# Patient Record
Sex: FEMALE | Race: BLACK OR AFRICAN AMERICAN | NOT HISPANIC OR LATINO | Employment: FULL TIME | URBAN - METROPOLITAN AREA
[De-identification: names, ages, dates, MRNs, and addresses within clinical notes are randomized per-mention and may not be internally consistent; named-entity substitution may affect disease eponyms.]

---

## 2018-01-29 ENCOUNTER — OFFICE VISIT (OUTPATIENT)
Dept: URGENT CARE | Facility: CLINIC | Age: 63
End: 2018-01-29
Payer: COMMERCIAL

## 2018-01-29 VITALS
DIASTOLIC BLOOD PRESSURE: 78 MMHG | WEIGHT: 225 LBS | TEMPERATURE: 101 F | HEART RATE: 104 BPM | RESPIRATION RATE: 16 BRPM | HEIGHT: 66 IN | BODY MASS INDEX: 36.16 KG/M2 | OXYGEN SATURATION: 96 % | SYSTOLIC BLOOD PRESSURE: 127 MMHG

## 2018-01-29 DIAGNOSIS — R68.89 FLU-LIKE SYMPTOMS: Primary | ICD-10-CM

## 2018-01-29 PROCEDURE — 99203 OFFICE O/P NEW LOW 30 MIN: CPT | Mod: S$GLB,,, | Performed by: EMERGENCY MEDICINE

## 2018-01-29 RX ORDER — CODEINE PHOSPHATE AND GUAIFENESIN 10; 100 MG/5ML; MG/5ML
5-10 SOLUTION ORAL 3 TIMES DAILY PRN
Qty: 120 ML | Refills: 0 | Status: SHIPPED | OUTPATIENT
Start: 2018-01-29 | End: 2018-02-03

## 2018-01-29 RX ORDER — OSELTAMIVIR PHOSPHATE 75 MG/1
75 CAPSULE ORAL 2 TIMES DAILY
Qty: 10 CAPSULE | Refills: 0 | Status: SHIPPED | OUTPATIENT
Start: 2018-01-29 | End: 2018-02-03

## 2018-01-29 NOTE — PATIENT INSTRUCTIONS
Porfirio Griffin MD  Go to the Emergency Department for any problems  Call your PCP for follow up next available.    Influenza (Adult)    Influenza is also called the flu. It is a viral illness that affects the air passages of your lungs. It is different from the common cold. The flu can easily be passed from one to person to another. It may be spread through the air by coughing and sneezing. Or it can be spread by touching the sick person and then touching your own eyes, nose, or mouth.  The flu starts 1 to 3 days after you are exposed to the flu virus. It may last for 1 to 2 weeks but many people feel tired or fatigued for many weeks afterward. You usually dont need to take antibiotics unless you have a complication. This might be an ear or sinus infection or pneumonia.  Symptoms of the flu may be mild or severe. They can include extreme tiredness (wanting to stay in bed all day), chills, fevers, muscle aches, soreness with eye movement, headache, and a dry, hacking cough.  Home care  Follow these guidelines when caring for yourself at home:  · Avoid being around cigarette smoke, whether yours or other peoples.  · Acetaminophen or ibuprofen will help ease your fever, muscle aches, and headache. Dont give aspirin to anyone younger than 18 who has the flu. Aspirin can harm the liver.  · Nausea and loss of appetite are common with the flu. Eat light meals. Drink 6 to 8 glasses of liquids every day. Good choices are water, sport drinks, soft drinks without caffeine, juices, tea, and soup. Extra fluids will also help loosen secretions in your nose and lungs.  · Over-the-counter cold medicines will not make the flu go away faster. But the medicines may help with coughing, sore throat, and congestion in your nose and sinuses. Dont use a decongestant if you have high blood pressure.  · Stay home until your fever has been gone for at least 24 hours without using medicine to reduce fever.  Follow-up care  Follow up with  "your healthcare provider, or as advised, if you are not getting better over the next week.  If you are age 65 or older, talk with your provider about getting a pneumococcal vaccine every 5 years. You should also get this vaccine if you have chronic asthma or COPD. All adults should get a flu vaccine every fall. Ask your provider about this.  When to seek medical advice  Call your healthcare provider right away if any of these occur:  · Cough with lots of colored mucus (sputum) or blood in your mucus  · Chest pain, shortness of breath, wheezing, or trouble breathing  · Severe headache, or face, neck, or ear pain  · New rash with fever  · Fever of 100.4°F (38°C) or higher, or as directed by your healthcare provider  · Confusion, behavior change, or seizure  · Severe weakness or dizziness  · You get a new fever or cough after getting better for a few days  Date Last Reviewed: 1/1/2017  © 4416-7878 [employer]. [address] [place] [address]. All rights reserved. This information is not intended as a substitute for professional medical care. Always follow your healthcare professional's instructions.        Viral Syndrome (Adult)  A viral illness may cause a number of symptoms. The symptoms depend on the part of the body that the virus affects. If it settles in your nose, throat, and lungs, it may cause cough, sore throat, congestion, and sometimes headache. If it settles in your stomach and intestinal tract, it may cause vomiting and diarrhea. Sometimes it causes vague symptoms like "aching all over," feeling tired, loss of appetite, or fever.  A viral illness usually lasts 1 to 2 weeks, but sometimes it lasts longer. In some cases, a more serious infection can look like a viral syndrome in the first few days of the illness. You may need another exam and additional tests to know the difference. Watch for the warning signs listed below.  Home care  Follow these guidelines for taking care of " yourself at home:  · If symptoms are severe, rest at home for the first 2 to 3 days.  · Stay away from cigarette smoke - both your smoke and the smoke from others.  · You may use over-the-counter acetaminophen or ibuprofen for fever, muscle aching, and headache, unless another medicine was prescribed for this. If you have chronic liver or kidney disease or ever had a stomach ulcer or GI bleeding, talk with your doctor before using these medicines. No one who is younger than 18 and ill with a fever should take aspirin. It may cause severe disease or death.  · Your appetite may be poor, so a light diet is fine. Avoid dehydration by drinking 8 to 12 8-ounce glasses of fluids each day. This may include water; orange juice; lemonade; apple, grape, and cranberry juice; clear fruit drinks; electrolyte replacement and sports drinks; and decaffeinated teas and coffee. If you have been diagnosed with a kidney disease, ask your doctor how much and what types of fluids you should drink to prevent dehydration. If you have kidney disease, drinking too much fluid can cause it build up in the your body and be dangerous to your health.  · Over-the-counter remedies won't shorten the length of the illness but may be helpful for cough, sore throat; and nasal and sinus congestion. Don't use decongestants if you have high blood pressure.  Follow-up care  Follow up with your healthcare provider if you do not improve over the next week.  Call 911  Get emergency medical care if any of the following occur:  · Convulsion  · Feeling weak, dizzy, or like you are going to faint  · Chest pain, shortness of breath, wheezing, or difficulty breathing  When to seek medical advice  Call your healthcare provider right away if any of these occur:  · Cough with lots of colored sputum (mucus) or blood in your sputum  · Chest pain, shortness of breath, wheezing, or difficulty breathing  · Severe headache; face, neck, or ear pain  · Severe, constant pain in  the lower right side of your belly (abdominal)  · Continued vomiting (cant keep liquids down)  · Frequent diarrhea (more than 5 times a day); blood (red or black color) or mucus in diarrhea  · Feeling weak, dizzy, or like you are going to faint  · Extreme thirst  · Fever of 100.4°F (38°C) or higher, or as directed by your healthcare provider  Date Last Reviewed: 9/25/2015  © 4238-4133 [employer]. [address] [place] [address]. All rights reserved. This information is not intended as a substitute for professional medical care. Always follow your healthcare professional's instructions.

## 2018-01-29 NOTE — PROGRESS NOTES
"Subjective:       Patient ID: Rufus Carpenter is a 62 y.o. female.    Vitals:  height is 5' 6" (1.676 m) and weight is 102.1 kg (225 lb). Her oral temperature is 100.8 °F (38.2 °C) (abnormal). Her blood pressure is 127/78 and her pulse is 104. Her respiration is 16 and oxygen saturation is 96%.     Chief Complaint: Fever    Symptoms started almost 2 d ago.      Fever    This is a new problem. The current episode started in the past 7 days. The problem occurs intermittently. The problem has been unchanged. The maximum temperature noted was 100 to 100.9 F. The temperature was taken using an oral thermometer. Associated symptoms include congestion, coughing, diarrhea, muscle aches and a sore throat. Pertinent negatives include no abdominal pain, chest pain, ear pain, headaches, nausea or wheezing. She has tried NSAIDs for the symptoms. The treatment provided mild relief.     Review of Systems   Constitution: Positive for chills. Negative for fever and malaise/fatigue.   HENT: Positive for congestion and sore throat. Negative for ear pain and hoarse voice.    Eyes: Negative for discharge and redness.   Cardiovascular: Negative for chest pain, dyspnea on exertion and leg swelling.   Respiratory: Positive for cough. Negative for shortness of breath, sputum production and wheezing.    Musculoskeletal: Positive for muscle weakness. Negative for myalgias.   Gastrointestinal: Positive for diarrhea. Negative for abdominal pain and nausea.   Neurological: Negative for headaches.       Objective:      Physical Exam   Constitutional: She is oriented to person, place, and time. She appears well-developed and well-nourished.   HENT:   Head: Normocephalic and atraumatic.   Right Ear: Tympanic membrane, external ear and ear canal normal.   Left Ear: Tympanic membrane, external ear and ear canal normal.   Nose: Mucosal edema present. No rhinorrhea. Right sinus exhibits no maxillary sinus tenderness and no frontal sinus tenderness. " Left sinus exhibits no maxillary sinus tenderness and no frontal sinus tenderness.   Mouth/Throat: Uvula is midline, oropharynx is clear and moist and mucous membranes are normal.   Eyes: EOM are normal. Pupils are equal, round, and reactive to light.   Neck: Normal range of motion. Neck supple.   Cardiovascular: Normal rate, regular rhythm and normal heart sounds.    Pulmonary/Chest: Breath sounds normal.   Musculoskeletal: Normal range of motion.   Lymphadenopathy:     She has no cervical adenopathy.   Neurological: She is alert and oriented to person, place, and time.   Skin: Skin is warm and dry.   Psychiatric: She has a normal mood and affect. Her behavior is normal.       Assessment:       1. Flu-like symptoms        Plan:         Flu-like symptoms  -     guaifenesin-codeine 100-10 mg/5 ml (TUSSI-ORGANIDIN NR)  mg/5 mL syrup; Take 5-10 mLs by mouth 3 (three) times daily as needed for Cough.  Dispense: 120 mL; Refill: 0  -     oseltamivir (TAMIFLU) 75 MG capsule; Take 1 capsule (75 mg total) by mouth 2 (two) times daily.  Dispense: 10 capsule; Refill: 0      Porfirio Griffin MD  Go to the Emergency Department for any problems  Call your PCP for follow up next available.

## 2018-01-29 NOTE — LETTER
January 29, 2018      [facility]  [address], [address]  [place] [address]  Phone: [phone]  Fax: [phone]       Patient: Rufus Carpenter   YOB: 1955  Date of Visit: 01/29/2018    To Whom It May Concern:    Adela Carpenter  was at [facility] on 01/29/2018. She may return to work/school on 2/5/18, earlier if feels better and no fever for 24 hours with no restrictions. If you have any questions or concerns, or if I can be of further assistance, please do not hesitate to contact me.    Sincerely,            Porfirio Griffin MD

## 2018-02-01 ENCOUNTER — TELEPHONE (OUTPATIENT)
Dept: URGENT CARE | Facility: CLINIC | Age: 63
End: 2018-02-01

## 2022-03-12 ENCOUNTER — HOSPITAL ENCOUNTER (INPATIENT)
Facility: HOSPITAL | Age: 67
LOS: 3 days | Discharge: HOME | DRG: 547 | End: 2022-03-15
Attending: EMERGENCY MEDICINE | Admitting: HOSPITALIST
Payer: MEDICARE

## 2022-03-12 DIAGNOSIS — M31.6 GCA (GIANT CELL ARTERITIS): Primary | ICD-10-CM

## 2022-03-12 DIAGNOSIS — R07.9 CHEST PAIN: ICD-10-CM

## 2022-03-12 DIAGNOSIS — E11.9 TYPE 2 DIABETES MELLITUS WITHOUT COMPLICATION, WITHOUT LONG-TERM CURRENT USE OF INSULIN: ICD-10-CM

## 2022-03-12 LAB
ALBUMIN SERPL BCP-MCNC: 3.8 G/DL (ref 3.5–5.2)
ALP SERPL-CCNC: 112 U/L (ref 55–135)
ALT SERPL W/O P-5'-P-CCNC: 9 U/L (ref 10–44)
ANION GAP SERPL CALC-SCNC: 14 MMOL/L (ref 8–16)
AST SERPL-CCNC: 15 U/L (ref 10–40)
BASOPHILS # BLD AUTO: 0.07 K/UL (ref 0–0.2)
BASOPHILS NFR BLD: 0.9 % (ref 0–1.9)
BILIRUB SERPL-MCNC: 0.3 MG/DL (ref 0.1–1)
BUN SERPL-MCNC: 14 MG/DL (ref 8–23)
CALCIUM SERPL-MCNC: 9.4 MG/DL (ref 8.7–10.5)
CHLORIDE SERPL-SCNC: 107 MMOL/L (ref 95–110)
CO2 SERPL-SCNC: 21 MMOL/L (ref 23–29)
CREAT SERPL-MCNC: 0.8 MG/DL (ref 0.5–1.4)
CRP SERPL-MCNC: 12.9 MG/L (ref 0–8.2)
CTP QC/QA: YES
DIFFERENTIAL METHOD: NORMAL
EOSINOPHIL # BLD AUTO: 0.2 K/UL (ref 0–0.5)
EOSINOPHIL NFR BLD: 2.3 % (ref 0–8)
ERYTHROCYTE [DISTWIDTH] IN BLOOD BY AUTOMATED COUNT: 13.6 % (ref 11.5–14.5)
ERYTHROCYTE [SEDIMENTATION RATE] IN BLOOD BY WESTERGREN METHOD: 56 MM/HR (ref 0–36)
EST. GFR  (AFRICAN AMERICAN): >60 ML/MIN/1.73 M^2
EST. GFR  (NON AFRICAN AMERICAN): >60 ML/MIN/1.73 M^2
GLUCOSE SERPL-MCNC: 96 MG/DL (ref 70–110)
HCT VFR BLD AUTO: 43.1 % (ref 37–48.5)
HGB BLD-MCNC: 13.8 G/DL (ref 12–16)
IMM GRANULOCYTES # BLD AUTO: 0.03 K/UL (ref 0–0.04)
IMM GRANULOCYTES NFR BLD AUTO: 0.4 % (ref 0–0.5)
LYMPHOCYTES # BLD AUTO: 3.2 K/UL (ref 1–4.8)
LYMPHOCYTES NFR BLD: 41.4 % (ref 18–48)
MAGNESIUM SERPL-MCNC: 1.9 MG/DL (ref 1.6–2.6)
MCH RBC QN AUTO: 27.7 PG (ref 27–31)
MCHC RBC AUTO-ENTMCNC: 32 G/DL (ref 32–36)
MCV RBC AUTO: 86 FL (ref 82–98)
MONOCYTES # BLD AUTO: 0.5 K/UL (ref 0.3–1)
MONOCYTES NFR BLD: 6.2 % (ref 4–15)
NEUTROPHILS # BLD AUTO: 3.8 K/UL (ref 1.8–7.7)
NEUTROPHILS NFR BLD: 48.8 % (ref 38–73)
NRBC BLD-RTO: 0 /100 WBC
PLATELET # BLD AUTO: 252 K/UL (ref 150–450)
PMV BLD AUTO: 10.1 FL (ref 9.2–12.9)
POTASSIUM SERPL-SCNC: 4.6 MMOL/L (ref 3.5–5.1)
PROT SERPL-MCNC: 8.3 G/DL (ref 6–8.4)
RBC # BLD AUTO: 4.99 M/UL (ref 4–5.4)
SARS-COV-2 RDRP RESP QL NAA+PROBE: NEGATIVE
SODIUM SERPL-SCNC: 142 MMOL/L (ref 136–145)
WBC # BLD AUTO: 7.8 K/UL (ref 3.9–12.7)

## 2022-03-12 PROCEDURE — 80053 COMPREHEN METABOLIC PANEL: CPT | Performed by: EMERGENCY MEDICINE

## 2022-03-12 PROCEDURE — U0002 COVID-19 LAB TEST NON-CDC: HCPCS | Performed by: EMERGENCY MEDICINE

## 2022-03-12 PROCEDURE — 85025 COMPLETE CBC W/AUTO DIFF WBC: CPT | Performed by: EMERGENCY MEDICINE

## 2022-03-12 PROCEDURE — 86140 C-REACTIVE PROTEIN: CPT | Performed by: EMERGENCY MEDICINE

## 2022-03-12 PROCEDURE — 94761 N-INVAS EAR/PLS OXIMETRY MLT: CPT

## 2022-03-12 PROCEDURE — 85652 RBC SED RATE AUTOMATED: CPT | Performed by: EMERGENCY MEDICINE

## 2022-03-12 PROCEDURE — 99285 EMERGENCY DEPT VISIT HI MDM: CPT | Mod: 25

## 2022-03-12 PROCEDURE — 99285 EMERGENCY DEPT VISIT HI MDM: CPT | Mod: CS,,, | Performed by: EMERGENCY MEDICINE

## 2022-03-12 PROCEDURE — 99285 PR EMERGENCY DEPT VISIT,LEVEL V: ICD-10-PCS | Mod: CS,,, | Performed by: EMERGENCY MEDICINE

## 2022-03-12 PROCEDURE — 20600001 HC STEP DOWN PRIVATE ROOM

## 2022-03-12 PROCEDURE — 83735 ASSAY OF MAGNESIUM: CPT | Performed by: EMERGENCY MEDICINE

## 2022-03-12 RX ORDER — OXYCODONE HYDROCHLORIDE 5 MG/1
5 TABLET ORAL EVERY 6 HOURS PRN
Status: DISCONTINUED | OUTPATIENT
Start: 2022-03-12 | End: 2022-03-15 | Stop reason: HOSPADM

## 2022-03-12 RX ORDER — NALOXONE HCL 0.4 MG/ML
0.02 VIAL (ML) INJECTION
Status: DISCONTINUED | OUTPATIENT
Start: 2022-03-12 | End: 2022-03-15 | Stop reason: HOSPADM

## 2022-03-12 RX ORDER — MAG HYDROX/ALUMINUM HYD/SIMETH 200-200-20
30 SUSPENSION, ORAL (FINAL DOSE FORM) ORAL 4 TIMES DAILY PRN
Status: DISCONTINUED | OUTPATIENT
Start: 2022-03-12 | End: 2022-03-15 | Stop reason: HOSPADM

## 2022-03-12 RX ORDER — HEPARIN SODIUM 5000 [USP'U]/ML
5000 INJECTION, SOLUTION INTRAVENOUS; SUBCUTANEOUS EVERY 8 HOURS
Status: DISCONTINUED | OUTPATIENT
Start: 2022-03-12 | End: 2022-03-15 | Stop reason: HOSPADM

## 2022-03-12 RX ORDER — IBUPROFEN 200 MG
16 TABLET ORAL
Status: DISCONTINUED | OUTPATIENT
Start: 2022-03-12 | End: 2022-03-15 | Stop reason: HOSPADM

## 2022-03-12 RX ORDER — PREDNISONE 20 MG/1
60 TABLET ORAL DAILY
Status: DISCONTINUED | OUTPATIENT
Start: 2022-03-12 | End: 2022-03-12

## 2022-03-12 RX ORDER — PANTOPRAZOLE SODIUM 40 MG/1
40 TABLET, DELAYED RELEASE ORAL DAILY
Status: DISCONTINUED | OUTPATIENT
Start: 2022-03-13 | End: 2022-03-15 | Stop reason: HOSPADM

## 2022-03-12 RX ORDER — TALC
6 POWDER (GRAM) TOPICAL NIGHTLY PRN
Status: DISCONTINUED | OUTPATIENT
Start: 2022-03-12 | End: 2022-03-15 | Stop reason: HOSPADM

## 2022-03-12 RX ORDER — GLUCAGON 1 MG
1 KIT INJECTION
Status: DISCONTINUED | OUTPATIENT
Start: 2022-03-12 | End: 2022-03-15 | Stop reason: HOSPADM

## 2022-03-12 RX ORDER — IPRATROPIUM BROMIDE AND ALBUTEROL SULFATE 2.5; .5 MG/3ML; MG/3ML
3 SOLUTION RESPIRATORY (INHALATION) EVERY 6 HOURS PRN
Status: DISCONTINUED | OUTPATIENT
Start: 2022-03-12 | End: 2022-03-15 | Stop reason: HOSPADM

## 2022-03-12 RX ORDER — INSULIN ASPART 100 [IU]/ML
0-5 INJECTION, SOLUTION INTRAVENOUS; SUBCUTANEOUS
Status: DISCONTINUED | OUTPATIENT
Start: 2022-03-12 | End: 2022-03-15 | Stop reason: HOSPADM

## 2022-03-12 RX ORDER — ACETAMINOPHEN 500 MG
1000 TABLET ORAL EVERY 8 HOURS PRN
Status: DISCONTINUED | OUTPATIENT
Start: 2022-03-12 | End: 2022-03-15 | Stop reason: HOSPADM

## 2022-03-12 RX ORDER — SODIUM CHLORIDE 0.9 % (FLUSH) 0.9 %
10 SYRINGE (ML) INJECTION EVERY 12 HOURS PRN
Status: DISCONTINUED | OUTPATIENT
Start: 2022-03-12 | End: 2022-03-15 | Stop reason: HOSPADM

## 2022-03-12 RX ORDER — IBUPROFEN 200 MG
24 TABLET ORAL
Status: DISCONTINUED | OUTPATIENT
Start: 2022-03-12 | End: 2022-03-15 | Stop reason: HOSPADM

## 2022-03-12 RX ORDER — ONDANSETRON 2 MG/ML
4 INJECTION INTRAMUSCULAR; INTRAVENOUS EVERY 8 HOURS PRN
Status: DISCONTINUED | OUTPATIENT
Start: 2022-03-12 | End: 2022-03-15 | Stop reason: HOSPADM

## 2022-03-13 PROBLEM — M31.6 GCA (GIANT CELL ARTERITIS): Status: ACTIVE | Noted: 2022-03-13

## 2022-03-13 PROBLEM — R73.9 STEROID-INDUCED HYPERGLYCEMIA: Status: ACTIVE | Noted: 2022-03-13

## 2022-03-13 PROBLEM — R68.89 FLU-LIKE SYMPTOMS: Status: RESOLVED | Noted: 2018-01-29 | Resolved: 2022-03-13

## 2022-03-13 PROBLEM — T38.0X5A STEROID-INDUCED HYPERGLYCEMIA: Status: ACTIVE | Noted: 2022-03-13

## 2022-03-13 LAB
ALBUMIN SERPL BCP-MCNC: 3.7 G/DL (ref 3.5–5.2)
ALP SERPL-CCNC: 108 U/L (ref 55–135)
ALT SERPL W/O P-5'-P-CCNC: 9 U/L (ref 10–44)
ANION GAP SERPL CALC-SCNC: 13 MMOL/L (ref 8–16)
AST SERPL-CCNC: 11 U/L (ref 10–40)
BACTERIA #/AREA URNS AUTO: ABNORMAL /HPF
BASOPHILS # BLD AUTO: 0.03 K/UL (ref 0–0.2)
BASOPHILS NFR BLD: 0.4 % (ref 0–1.9)
BILIRUB SERPL-MCNC: 0.4 MG/DL (ref 0.1–1)
BILIRUB UR QL STRIP: NEGATIVE
BUN SERPL-MCNC: 17 MG/DL (ref 8–23)
CALCIUM SERPL-MCNC: 9.7 MG/DL (ref 8.7–10.5)
CCP AB SER IA-ACNC: 1 U/ML
CHLORIDE SERPL-SCNC: 106 MMOL/L (ref 95–110)
CK SERPL-CCNC: 67 U/L (ref 20–180)
CLARITY UR REFRACT.AUTO: CLEAR
CO2 SERPL-SCNC: 21 MMOL/L (ref 23–29)
COLOR UR AUTO: YELLOW
CREAT SERPL-MCNC: 0.9 MG/DL (ref 0.5–1.4)
DIFFERENTIAL METHOD: ABNORMAL
EOSINOPHIL # BLD AUTO: 0 K/UL (ref 0–0.5)
EOSINOPHIL NFR BLD: 0.1 % (ref 0–8)
ERYTHROCYTE [DISTWIDTH] IN BLOOD BY AUTOMATED COUNT: 13.4 % (ref 11.5–14.5)
EST. GFR  (AFRICAN AMERICAN): >60 ML/MIN/1.73 M^2
EST. GFR  (NON AFRICAN AMERICAN): >60 ML/MIN/1.73 M^2
GLUCOSE SERPL-MCNC: 210 MG/DL (ref 70–110)
GLUCOSE UR QL STRIP: ABNORMAL
HCT VFR BLD AUTO: 41 % (ref 37–48.5)
HGB BLD-MCNC: 13.1 G/DL (ref 12–16)
HGB UR QL STRIP: NEGATIVE
HYALINE CASTS UR QL AUTO: 3 /LPF
IMM GRANULOCYTES # BLD AUTO: 0.02 K/UL (ref 0–0.04)
IMM GRANULOCYTES NFR BLD AUTO: 0.2 % (ref 0–0.5)
KETONES UR QL STRIP: ABNORMAL
LEUKOCYTE ESTERASE UR QL STRIP: NEGATIVE
LYMPHOCYTES # BLD AUTO: 1 K/UL (ref 1–4.8)
LYMPHOCYTES NFR BLD: 13 % (ref 18–48)
MAGNESIUM SERPL-MCNC: 1.9 MG/DL (ref 1.6–2.6)
MCH RBC QN AUTO: 26.8 PG (ref 27–31)
MCHC RBC AUTO-ENTMCNC: 32 G/DL (ref 32–36)
MCV RBC AUTO: 84 FL (ref 82–98)
MICROSCOPIC COMMENT: ABNORMAL
MONOCYTES # BLD AUTO: 0.1 K/UL (ref 0.3–1)
MONOCYTES NFR BLD: 1.4 % (ref 4–15)
NEUTROPHILS # BLD AUTO: 6.8 K/UL (ref 1.8–7.7)
NEUTROPHILS NFR BLD: 84.9 % (ref 38–73)
NITRITE UR QL STRIP: NEGATIVE
NRBC BLD-RTO: 0 /100 WBC
PH UR STRIP: 5 [PH] (ref 5–8)
PHOSPHATE SERPL-MCNC: 1.6 MG/DL (ref 2.7–4.5)
PLATELET # BLD AUTO: 283 K/UL (ref 150–450)
PMV BLD AUTO: 10.3 FL (ref 9.2–12.9)
POCT GLUCOSE: 253 MG/DL (ref 70–110)
POCT GLUCOSE: 267 MG/DL (ref 70–110)
POCT GLUCOSE: 280 MG/DL (ref 70–110)
POCT GLUCOSE: 283 MG/DL (ref 70–110)
POCT GLUCOSE: 307 MG/DL (ref 70–110)
POTASSIUM SERPL-SCNC: 3.9 MMOL/L (ref 3.5–5.1)
PROT SERPL-MCNC: 7.8 G/DL (ref 6–8.4)
PROT UR QL STRIP: NEGATIVE
RBC # BLD AUTO: 4.88 M/UL (ref 4–5.4)
RBC #/AREA URNS AUTO: 2 /HPF (ref 0–4)
RHEUMATOID FACT SERPL-ACNC: <13 IU/ML (ref 0–15)
SODIUM SERPL-SCNC: 140 MMOL/L (ref 136–145)
SP GR UR STRIP: >=1.03 (ref 1–1.03)
SQUAMOUS #/AREA URNS AUTO: 1 /HPF
URN SPEC COLLECT METH UR: ABNORMAL
WBC # BLD AUTO: 8.02 K/UL (ref 3.9–12.7)
WBC #/AREA URNS AUTO: 2 /HPF (ref 0–5)
YEAST UR QL AUTO: ABNORMAL

## 2022-03-13 PROCEDURE — 99499 UNLISTED E&M SERVICE: CPT | Mod: ,,, | Performed by: HOSPITALIST

## 2022-03-13 PROCEDURE — 20600001 HC STEP DOWN PRIVATE ROOM

## 2022-03-13 PROCEDURE — 86431 RHEUMATOID FACTOR QUANT: CPT

## 2022-03-13 PROCEDURE — 36415 COLL VENOUS BLD VENIPUNCTURE: CPT

## 2022-03-13 PROCEDURE — 84165 PATHOLOGIST INTERPRETATION SPE: ICD-10-PCS | Mod: 26,,, | Performed by: PATHOLOGY

## 2022-03-13 PROCEDURE — 63600175 PHARM REV CODE 636 W HCPCS: Performed by: FAMILY MEDICINE

## 2022-03-13 PROCEDURE — 84165 PROTEIN E-PHORESIS SERUM: CPT | Mod: 26,,, | Performed by: PATHOLOGY

## 2022-03-13 PROCEDURE — 82550 ASSAY OF CK (CPK): CPT

## 2022-03-13 PROCEDURE — 25000003 PHARM REV CODE 250: Performed by: EMERGENCY MEDICINE

## 2022-03-13 PROCEDURE — 81001 URINALYSIS AUTO W/SCOPE: CPT

## 2022-03-13 PROCEDURE — 85025 COMPLETE CBC W/AUTO DIFF WBC: CPT | Performed by: FAMILY MEDICINE

## 2022-03-13 PROCEDURE — 25500020 PHARM REV CODE 255: Performed by: HOSPITALIST

## 2022-03-13 PROCEDURE — 25000003 PHARM REV CODE 250: Performed by: FAMILY MEDICINE

## 2022-03-13 PROCEDURE — A9585 GADOBUTROL INJECTION: HCPCS | Performed by: HOSPITALIST

## 2022-03-13 PROCEDURE — 83735 ASSAY OF MAGNESIUM: CPT | Performed by: FAMILY MEDICINE

## 2022-03-13 PROCEDURE — 99222 PR INITIAL HOSPITAL CARE,LEVL II: ICD-10-PCS | Mod: GC,,, | Performed by: INTERNAL MEDICINE

## 2022-03-13 PROCEDURE — 80053 COMPREHEN METABOLIC PANEL: CPT | Performed by: FAMILY MEDICINE

## 2022-03-13 PROCEDURE — 99222 PR INITIAL HOSPITAL CARE,LEVL II: ICD-10-PCS | Mod: GC,,, | Performed by: SURGERY

## 2022-03-13 PROCEDURE — 36415 COLL VENOUS BLD VENIPUNCTURE: CPT | Performed by: FAMILY MEDICINE

## 2022-03-13 PROCEDURE — 86200 CCP ANTIBODY: CPT

## 2022-03-13 PROCEDURE — 63600175 PHARM REV CODE 636 W HCPCS: Performed by: EMERGENCY MEDICINE

## 2022-03-13 PROCEDURE — 82085 ASSAY OF ALDOLASE: CPT

## 2022-03-13 PROCEDURE — 84100 ASSAY OF PHOSPHORUS: CPT | Performed by: FAMILY MEDICINE

## 2022-03-13 PROCEDURE — 94761 N-INVAS EAR/PLS OXIMETRY MLT: CPT

## 2022-03-13 PROCEDURE — 99499 NO LOS: ICD-10-PCS | Mod: ,,, | Performed by: HOSPITALIST

## 2022-03-13 PROCEDURE — 86038 ANTINUCLEAR ANTIBODIES: CPT

## 2022-03-13 PROCEDURE — 25000003 PHARM REV CODE 250: Performed by: HOSPITALIST

## 2022-03-13 PROCEDURE — 99223 PR INITIAL HOSPITAL CARE,LEVL III: ICD-10-PCS | Mod: ,,, | Performed by: FAMILY MEDICINE

## 2022-03-13 PROCEDURE — 99223 1ST HOSP IP/OBS HIGH 75: CPT | Mod: ,,, | Performed by: FAMILY MEDICINE

## 2022-03-13 PROCEDURE — 84165 PROTEIN E-PHORESIS SERUM: CPT

## 2022-03-13 PROCEDURE — 99222 1ST HOSP IP/OBS MODERATE 55: CPT | Mod: GC,,, | Performed by: INTERNAL MEDICINE

## 2022-03-13 PROCEDURE — 99222 1ST HOSP IP/OBS MODERATE 55: CPT | Mod: GC,,, | Performed by: SURGERY

## 2022-03-13 RX ORDER — GADOBUTROL 604.72 MG/ML
10 INJECTION INTRAVENOUS
Status: COMPLETED | OUTPATIENT
Start: 2022-03-13 | End: 2022-03-13

## 2022-03-13 RX ORDER — SODIUM,POTASSIUM PHOSPHATES 280-250MG
1 POWDER IN PACKET (EA) ORAL
Status: COMPLETED | OUTPATIENT
Start: 2022-03-13 | End: 2022-03-14

## 2022-03-13 RX ORDER — HYDRALAZINE HYDROCHLORIDE 25 MG/1
25 TABLET, FILM COATED ORAL EVERY 8 HOURS PRN
Status: DISCONTINUED | OUTPATIENT
Start: 2022-03-13 | End: 2022-03-15 | Stop reason: HOSPADM

## 2022-03-13 RX ADMIN — POTASSIUM & SODIUM PHOSPHATES POWDER PACK 280-160-250 MG 1 PACKET: 280-160-250 PACK at 11:03

## 2022-03-13 RX ADMIN — INSULIN ASPART 1 UNITS: 100 INJECTION, SOLUTION INTRAVENOUS; SUBCUTANEOUS at 09:03

## 2022-03-13 RX ADMIN — PANTOPRAZOLE SODIUM 40 MG: 40 TABLET, DELAYED RELEASE ORAL at 08:03

## 2022-03-13 RX ADMIN — INSULIN ASPART 3 UNITS: 100 INJECTION, SOLUTION INTRAVENOUS; SUBCUTANEOUS at 11:03

## 2022-03-13 RX ADMIN — POTASSIUM & SODIUM PHOSPHATES POWDER PACK 280-160-250 MG 1 PACKET: 280-160-250 PACK at 05:03

## 2022-03-13 RX ADMIN — HEPARIN SODIUM 5000 UNITS: 5000 INJECTION INTRAVENOUS; SUBCUTANEOUS at 07:03

## 2022-03-13 RX ADMIN — HEPARIN SODIUM 5000 UNITS: 5000 INJECTION INTRAVENOUS; SUBCUTANEOUS at 03:03

## 2022-03-13 RX ADMIN — INSULIN ASPART 4 UNITS: 100 INJECTION, SOLUTION INTRAVENOUS; SUBCUTANEOUS at 04:03

## 2022-03-13 RX ADMIN — GADOBUTROL 10 ML: 604.72 INJECTION INTRAVENOUS at 12:03

## 2022-03-13 RX ADMIN — HEPARIN SODIUM 5000 UNITS: 5000 INJECTION INTRAVENOUS; SUBCUTANEOUS at 09:03

## 2022-03-13 RX ADMIN — METHYLPREDNISOLONE SODIUM SUCCINATE 1000 MG: 1 INJECTION, POWDER, LYOPHILIZED, FOR SOLUTION INTRAMUSCULAR; INTRAVENOUS at 12:03

## 2022-03-13 RX ADMIN — POTASSIUM & SODIUM PHOSPHATES POWDER PACK 280-160-250 MG 1 PACKET: 280-160-250 PACK at 09:03

## 2022-03-13 NOTE — SUBJECTIVE & OBJECTIVE
Past Medical History:   Diagnosis Date    Edema     HTN (hypertension)        History reviewed. No pertinent surgical history.    Review of patient's allergies indicates:  No Known Allergies    No current facility-administered medications on file prior to encounter.     No current outpatient medications on file prior to encounter.     Family History       Problem Relation (Age of Onset)    Cancer Mother    Coronary artery disease Mother    Diabetes Mother    Hypertension Mother    Stroke Father          Tobacco Use    Smoking status: Never Smoker    Smokeless tobacco: Never Used   Substance and Sexual Activity    Alcohol use: No    Drug use: No    Sexual activity: Not on file     Review of Systems   Constitutional:  Negative for chills, fatigue and fever.   HENT:  Negative for rhinorrhea, sore throat, tinnitus and trouble swallowing.    Eyes:  Negative for photophobia and visual disturbance.   Respiratory:  Negative for cough, shortness of breath and wheezing.    Cardiovascular:  Negative for chest pain, palpitations and leg swelling.   Gastrointestinal:  Negative for abdominal pain, diarrhea, nausea and vomiting.   Genitourinary:  Negative for dysuria and hematuria.   Musculoskeletal:  Negative for neck pain and neck stiffness.   Skin:  Negative for rash and wound.   Neurological:  Positive for headaches. Negative for syncope, weakness and numbness.   Psychiatric/Behavioral:  Negative for confusion and decreased concentration.    Objective:     Vital Signs (Most Recent):  Temp: 98.2 °F (36.8 °C) (03/13/22 0018)  Pulse: 87 (03/13/22 0018)  Resp: 18 (03/13/22 0018)  BP: (!) 141/64 (03/13/22 0018)  SpO2: 97 % (03/13/22 0018)   Vital Signs (24h Range):  Temp:  [98.2 °F (36.8 °C)-98.6 °F (37 °C)] 98.2 °F (36.8 °C)  Pulse:  [68-87] 87  Resp:  [18] 18  SpO2:  [96 %-99 %] 97 %  BP: (134-150)/(62-85) 141/64     Weight: 95.3 kg (210 lb)  Body mass index is 36.05 kg/m².    Physical Exam  Constitutional:       General:  She is not in acute distress.     Appearance: She is not toxic-appearing or diaphoretic.   HENT:      Head: Normocephalic and atraumatic.      Nose: Nose normal.   Eyes:      General: No scleral icterus.     Extraocular Movements: Extraocular movements intact.      Pupils: Pupils are equal, round, and reactive to light.   Cardiovascular:      Rate and Rhythm: Normal rate and regular rhythm.   Pulmonary:      Effort: Pulmonary effort is normal. No respiratory distress.      Breath sounds: No wheezing or rales.   Abdominal:      General: Abdomen is flat. There is no distension.      Palpations: Abdomen is soft.      Tenderness: There is no abdominal tenderness. There is no guarding.   Musculoskeletal:         General: Normal range of motion.      Cervical back: Normal range of motion and neck supple. No rigidity.      Right lower leg: No edema.      Left lower leg: No edema.   Skin:     General: Skin is warm and dry.      Coloration: Skin is not jaundiced.   Neurological:      General: No focal deficit present.      Mental Status: She is alert and oriented to person, place, and time.      Cranial Nerves: No cranial nerve deficit.   Psychiatric:         Mood and Affect: Mood normal.         Behavior: Behavior normal.         CRANIAL NERVES     CN III, IV, VI   Pupils are equal, round, and reactive to light.     Significant Labs: All pertinent labs within the past 24 hours have been reviewed.  CBC:   Recent Labs   Lab 03/12/22  1831   WBC 7.80   HGB 13.8   HCT 43.1        CMP:   Recent Labs   Lab 03/12/22  1831      K 4.6      CO2 21*   GLU 96   BUN 14   CREATININE 0.8   CALCIUM 9.4   PROT 8.3   ALBUMIN 3.8   BILITOT 0.3   ALKPHOS 112   AST 15   ALT 9*   ANIONGAP 14   EGFRNONAA >60.0       Significant Imaging: I have reviewed all pertinent imaging results/findings within the past 24 hours.

## 2022-03-13 NOTE — CONSULTS
[facility]  Rheumatology  Consult Note    Patient Name: Rufus Carpenter  MRN: [id number]  Admission Date: 3/12/2022  Hospital Length of Stay: 1 days  Code Status: Full Code   Attending Provider: Yenifer Pugh MD  Primary Care Physician: Naty Sanon MD  Principal Problem:GCA (giant cell arteritis)    Consults  Subjective:     HPI: 66 year old AAF with no significant PMH presents to the ED with a complaint of intermittent headaches that started about a month ago. She states her headaches were initially started on the left side. She describes the headache as throbbing and relieved with OTC meds. She states using tylenol and ibuprofen also alleviates her headaches. She states the HA on her right side started on Thursday. She states she does not have a history of getting headaches. She states she has had sinus issues in the past, but she has not any since having COVID-19, which was a while ago.     Denies photophobia, visual disturbance, ear pain, congestion, sore throat, dysuria, leg swelling, neck stiffness, dizziness, weakness, numbness, or ever having shingles     In the ED patient afebrile and hemodynamically stable saturating well on room air. ESR and CRP elevated. Ophthalmology consulted and evaluated patient and felt in setting of temporal headache and elevated inflammatory markers recommended initiating treatment for GCA. ED discussed with rheumatology as well who recommended admission for further evaluation and iv steroids. Rheumatology consulted for assistance.     Patients denies any Rheum ROS. No family history of any autoimmune conditions.             Current Facility-Administered Medications   Medication Frequency    acetaminophen tablet 1,000 mg Q8H PRN    albuterol-ipratropium 2.5 mg-0.5 mg/3 mL nebulizer solution 3 mL Q6H PRN    aluminum-magnesium hydroxide-simethicone 200-200-20 mg/5 mL suspension 30 mL QID PRN    dextrose 10% bolus 125 mL PRN    dextrose 10% bolus 250 mL PRN     glucagon (human recombinant) injection 1 mg PRN    glucose chewable tablet 16 g PRN    glucose chewable tablet 24 g PRN    heparin (porcine) injection 5,000 Units Q8H    hydrALAZINE tablet 25 mg Q8H PRN    insulin aspart U-100 pen 0-5 Units QID (AC + HS) PRN    melatonin tablet 6 mg Nightly PRN    [START ON 3/14/2022] methylPREDNISolone sodium succinate (SOLU-MEDROL) 1,000 mg in dextrose 5 % 100 mL IVPB Daily    naloxone 0.4 mg/mL injection 0.02 mg PRN    ondansetron injection 4 mg Q8H PRN    oxyCODONE immediate release tablet 5 mg Q6H PRN    pantoprazole EC tablet 40 mg Daily    potassium, sodium phosphates 280-160-250 mg packet 1 packet QID (AC & HS)    sodium chloride 0.9% flush 10 mL Q12H PRN     Objective:     Vital Signs (Most Recent):  Temp: 96.5 °F (35.8 °C) (03/13/22 0737)  Pulse: 92 (03/13/22 0737)  Resp: 17 (03/13/22 0737)  BP: (!) 143/65 (03/13/22 0737)  SpO2: (!) 91 % (03/13/22 0737)  O2 Device (Oxygen Therapy): room air (03/13/22 0737)   Vital Signs (24h Range):  Temp:  [96.5 °F (35.8 °C)-99 °F (37.2 °C)] 96.5 °F (35.8 °C)  Pulse:  [68-92] 92  Resp:  [16-18] 17  SpO2:  [91 %-99 %] 91 %  BP: (132-152)/(62-85) 143/65     Weight: 95.3 kg (210 lb) (03/13/22 0113)  Body mass index is 36.05 kg/m².  Body surface area is 2.07 meters squared.      Intake/Output Summary (Last 24 hours) at 3/13/2022 1047  Last data filed at 3/13/2022 0858  Gross per 24 hour   Intake 300 ml   Output 300 ml   Net 0 ml       Physical Exam   Constitutional: She is oriented to person, place, and time. She appears obese. She does not appear ill. No distress.   HENT:   Head: Normocephalic and atraumatic.   Nose: Nose normal.   Mouth/Throat: Mucous membranes are moist.   Eyes: Pupils are equal, round, and reactive to light. Conjunctivae are normal.   Cardiovascular: Normal rate, regular rhythm, normal heart sounds and normal pulses.   Pulmonary/Chest: Effort normal. No respiratory distress. She has no wheezes.   Abdominal: Soft. Normal  appearance.   Musculoskeletal:         General: No swelling or tenderness. Normal range of motion.   Neurological: She is alert and oriented to person, place, and time. She displays no weakness. No cranial nerve deficit.   Skin: Skin is warm and dry. Capillary refill takes less than 2 seconds. No jaundice.   Psychiatric: Her behavior is normal. Mood, judgment and thought content normal.     Significant Labs:  CBC:   Recent Labs   Lab 03/12/22  1831 03/13/22  0521   WBC 7.80 8.02   HGB 13.8 13.1   HCT 43.1 41.0    283     CMP:   Recent Labs   Lab 03/13/22  0521   *   CALCIUM 9.7   ALBUMIN 3.7   PROT 7.8      K 3.9   CO2 21*      BUN 17   CREATININE 0.9   ALKPHOS 108   ALT 9*   AST 11   BILITOT 0.4       Significant Imaging:  Imaging results within the past 24 hours have been reviewed.    Assessment/Plan:     * GCA (giant cell arteritis)  66 year old AAF admitted to [facility] for concern of GCA due to new onset headaches that started 1 month ago, with most recent being on her right side and a elevated ESR of 56. Ophtho exam was normal but they felt concerned due to her clinical history and recommended treatment. Patient denies PMR symptoms, denies jaw claudication, denies visual symptoms, and denies temporal tenderness.     SED 56  CRP 12    MRA:   No evidence for significant mural thickening of the visualized superficial temporal arteries bilaterally to suggest active arteritis.    Assessment/Plan:  Patients clinical history for HA and elevated ESR can go along with GCA. Initial imaging negative, ultrasound is pending. Eye exam normal.     -Ultrasound temporal and axillary arteries  -Agree with vascular surgery consult for temporal artery biopsy  -Would recommend 3 days of IV solumedrol, currently Day 2/3  -Would transition to Prednisone 60mg after along with PPI  -Discussed and educated patient and family on GCA  -Will follow up with patient as outpatient after discharge within 1-2 weeks.      Case discussed with Dr. Foote, please await final attestation.         Thank you for your consult. I will follow-up with patient. Please contact us if you have any additional questions.    Glenn Hernandez MD  Rheumatology  [facility]    I have personally taken the history and examined the patient and concur with the resident's note as above.  She has a long history of nasal congestion.  She had COVID at [place].  It lasted 2-3 weeks.  One month ago she developed left-sided headache.  It did not last that long.  This week she developed right-sided headache.  Her daughter convinced her to come to the emergency room.  By the time she was there, the headache had resolved.  She had elevated sed rate and mildly elevated CRP.  She has no other signs or symptoms of GCA or PMR.  Her MRA is negative.  We await the results of the ultrasound and eventually the temporal artery biopsy.  We will continue her on prednisone 60 mg daily when she has completed the IV steroids.

## 2022-03-13 NOTE — PROGRESS NOTES
[facility] Medicine  Progress Note    Patient Name: Rufus Carpenter  MRN: [id number]  Patient Class: IP- Inpatient   Admission Date: 3/12/2022  Length of Stay: 1 days  Attending Physician: Yenifer Pugh MD  Primary Care Provider: Naty Sanon MD        Subjective:     Principal Problem:GCA (giant cell arteritis)        HPI:  The patient is a 66 y.o. female with a past medical history of HTN and edema who presents to the ED with a complaint of intermittent R headaches that have been occurring for about a month. She states her headaches were initially located at her left temple. However, she states her headache has moved to her right temple. She describes the headache at her left temple as throbbing. She states applying pressure to her temples alleviates the pain her headaches cause. She states using tylenol and ibuprofen also alleviates her headaches. She states she had a headache located at her right temple earlier today. A family member claims she was nauseous during the episode. She states coughing and sneezing makes her feel the headache more. She states she took a tylenol earlier today after her headache occurred, and she currently does not have a headache. She states she does not have a history of getting headaches. She states she has had sinus issues in the past, but she has not any since having COVID-19, which was a while ago. Denies photophobia, visual disturbance, ear pain, congestion, sore throat, dysuria, leg swelling, neck stiffness, dizziness, weakness, numbness, or ever having shingles    In the ED patient afebrile and hemodynamically stable saturating well on room air. ESR and CRP elevated. Ophthalmology consulted and evaluated patient and felt in setting of temporal headache and elevated inflammatory markers recommended initiating treatment for GCA. ED discussed with rheumatology as well who recommended admission for further evaluation and iv steroids. Patient admitted to  the care of medicine for further evaluation and management.      Overview/Hospital Course:  No notes on file    Interval History: feels well this am and at her baseline. No current HA.  No nausea, vomiting, dyspnea, constipation. Sugar is up but really likes to drink sweet tea and cokes. Slept well last night. Daughter (peds nurse) on the phone and  at bedside during interview. Had not yet seen rheumatology but did see vascular this am.  All questions answered    Review of Systems   Constitutional:  Negative for fatigue.   Respiratory:  Negative for cough, chest tightness and shortness of breath.    Cardiovascular:  Negative for chest pain.   Gastrointestinal:  Negative for constipation, nausea and vomiting.   Genitourinary:  Negative for dysuria.   Neurological:  Positive for headaches.   All other systems reviewed and are negative.  Objective:     Vital Signs (Most Recent):  Temp: 96 °F (35.6 °C) (03/13/22 1124)  Pulse: 98 (03/13/22 1124)  Resp: 18 (03/13/22 1124)  BP: (!) 156/70 (03/13/22 1124)  SpO2: (!) 94 % (03/13/22 1124)   Vital Signs (24h Range):  Temp:  [96 °F (35.6 °C)-99 °F (37.2 °C)] 96 °F (35.6 °C)  Pulse:  [68-98] 98  Resp:  [16-18] 18  SpO2:  [91 %-99 %] 94 %  BP: (132-156)/(62-85) 156/70     Weight: 95.3 kg (210 lb)  Body mass index is 36.05 kg/m².    Intake/Output Summary (Last 24 hours) at 3/13/2022 1220  Last data filed at 3/13/2022 0858  Gross per 24 hour   Intake 300 ml   Output 300 ml   Net 0 ml      Physical Exam  Constitutional:       General: She is not in acute distress.     Appearance: She is not toxic-appearing or diaphoretic.   HENT:      Head: Normocephalic and atraumatic.      Comments: No tenderness over either temporal artery     Nose: Nose normal.   Eyes:      General: No scleral icterus.     Extraocular Movements: Extraocular movements intact.      Pupils: Pupils are equal, round, and reactive to light.   Cardiovascular:      Rate and Rhythm: Normal rate and regular rhythm.    Pulmonary:      Effort: Pulmonary effort is normal. No respiratory distress.      Breath sounds: No wheezing or rales.   Abdominal:      General: Abdomen is flat. There is no distension.      Palpations: Abdomen is soft.      Tenderness: There is no abdominal tenderness. There is no guarding.   Musculoskeletal:         General: Normal range of motion.      Cervical back: Normal range of motion and neck supple. No rigidity.      Right lower leg: No edema.      Left lower leg: No edema.   Skin:     General: Skin is warm and dry.      Coloration: Skin is not jaundiced.   Neurological:      General: No focal deficit present.      Mental Status: She is alert and oriented to person, place, and time.      Cranial Nerves: No cranial nerve deficit.   Psychiatric:         Mood and Affect: Mood normal.         Behavior: Behavior normal.       Significant Labs: All pertinent labs within the past 24 hours have been reviewed.    Significant Imaging: I have reviewed all pertinent imaging results/findings within the past 24 hours.      Assessment/Plan:      * GCA (giant cell arteritis)  New onset temporal HA over the last month and ESR and CRP elevated in setting of persistent temporal headaches raised suspicion for GCA. MRA GCA protocol without any abnormalities.   - Ophthamology consulted  ;  Appreciate recs  - Rheumatology consulted and will see patient   - Vascular surgery consulted to consider biopsy  - follow up US temporal and axillary arteries  - Solumedrol 1g daily for three doses  - protonix 40mg po daily while on steroids  - accuchecks ACHS and will start SSI while on high dose steroids  - pain control  - further management pending clinical course and future study review      Steroid-induced hyperglycemia  Sugars in 200s.  -discussed dietary modification with patient (she is not keen on stopping sugary drinks)  -check a1c in case of undiagnosed diabetes or prediabetes exacerbated by steroids  -continue blood glucose  monitoring and correction scale  -if remains persistently >200, can do daily NPH with am steroids      HTN (hypertension)  - controlled by diet  - hydralazine prn        VTE Risk Mitigation (From admission, onward)         Ordered     heparin (porcine) injection 5,000 Units  Every 8 hours         03/12/22 2152     IP VTE HIGH RISK PATIENT  Once         03/12/22 2152     Place sequential compression device  Until discontinued         03/12/22 2152                Discharge Planning   ANA:      Code Status: Full Code   Is the patient medically ready for discharge?:     Reason for patient still in hospital (select all that apply): Patient new problem, Laboratory test and Consult recommendations                     Yenifer Pugh MD  Department of Hospital Medicine   [facility]

## 2022-03-13 NOTE — ASSESSMENT & PLAN NOTE
66 year old AAF admitted to [facility] [place] for concern of GCA due to new onset headaches that started 1 month ago, with most recent being on her right side and a elevated ESR of 56. Ophtho exam was normal but they felt concerned due to her clinical history and recommended treatment. Patient denies PMR symptoms, denies jaw claudication, denies visual symptoms, and denies temporal tenderness.     SED 56  CRP 12    MRA:   No evidence for significant mural thickening of the visualized superficial temporal arteries bilaterally to suggest active arteritis.    Assessment/Plan:  Patients clinical history for HA and elevated ESR can go along with GCA. Initial imaging negative, ultrasound is pending. Eye exam normal.     -Ultrasound temporal and axillary arteries  -Agree with vascular surgery consult for temporal artery biopsy  -Would recommend 3 days of IV solumedrol, currently Day 2/3  -Would transition to Prednisone 60mg after along with PPI  -Discussed and educated patient and family on GCA  -Will follow up with patient as outpatient after discharge within 1-2 weeks.     Case discussed with Dr. Foote, please await final attestation.

## 2022-03-13 NOTE — PROGRESS NOTES
Consultation Report  Ophthalmology Service    Date: 03/13/2022    Chief complaint/Reason for Consult: GCA r/o     History of Present Illness: Rufus Carpenter is a 66 y.o. female who presents with 1 day of right sided temporal headache. States 4 days ago had headache on left side. Denies vision changes. Denies jaw claudication, arthralgias, fevers/chills, or weight loss.     Interval Hx 3/13/22: NAEON. Pt doing well. No visual complaints.    POcularHx: glasses    Current eye gtts: none      PMHx:  has a past medical history of Edema and HTN (hypertension).     PSurgHx:  has no past surgical history on file.     Home Medications:   Prior to Admission medications    Not on File        Medications this encounter:     Allergies: has No Known Allergies.     Social:  reports that she has never smoked. She has never used smokeless tobacco. She reports that she does not drink alcohol and does not use drugs.     Family Hx: No family history of glaucoma, macular degeneration, or blindness. family history includes Cancer in her mother; Coronary artery disease in her mother; Diabetes in her mother; Hypertension in her mother; Stroke in her father.     ROS: Negative x 10 except for complaints as described in HPI; negative for fever, chills, weight loss, nausea, vomiting, diarrhea, shortness of breath, nasal discharge, cough, abdominal pain, dyspnea, difficulty moving arms and legs, confusion, dysuria, palpitations, or chest pain     Ocular examination/Dilated fundus examination:  Base Eye Exam     Visual Acuity (Snellen - Linear)       Right Left    Dist sc 20/40 20/40          Tonometry (Tonopen, 8:31 PM)       Right Left    Pressure 14 15          Pupils       Dark Light Shape React APD    Right 3 2 Round Brisk None    Left 3 2 Round Brisk None          Visual Fields       Right Left     Full Full          Extraocular Movement       Right Left     Full, Ortho Full, Ortho            Slit Lamp and Fundus Exam     External Exam        Right Left    External Normal Normal          Slit Lamp Exam       Right Left    Lids/Lashes Normal Normal    Conjunctiva/Sclera White and quiet White and quiet    Cornea Clear Clear    Anterior Chamber Deep and quiet Deep and quiet    Iris Round and reactive Round and reactive    Lens 1+ Nuclear sclerosis, 1+ Cortical cataract 1+ Nuclear sclerosis, 1+ Cortical cataract    Anterior Vitreous Posterior vitreous detachment Normal          Fundus Exam       Right Left    Disc Normal Normal    C/D Ratio 0.2 0.2    Macula Normal Normal    Vessels Normal Normal    Periphery flat w ST CRS Normal                Assessment/Plan:   1. GCA R/O  - pt w right sided temporal headache x 1 day  - ESR elevated to 56, CRP elevated to 12.9, platelets wnl  - VA 20/40 OU, EOMI, no APD, IOP wnl, Color intact  - anterior exam with NSC, CC  - posterior exam without disc edema or other abnormality  - in setting of temporal headache + inflammatory markers believe it is reasonable to start treatment for suspected GCA. However, in absence of systemic symptoms, believe treatment could be done outpatient, but will defer to primary.    Recs  - cont steroids and PPI per primary  - vascular surgery on board for biopsy w/i week  - imaging pending, will f/u results  - ophthalmology to follow up outpatient in 1-2 weeks or sooner PRN      Discussed patient's history, physical, assessment and plan with Dr Witt.  If there are further questions, please page the on call ophthalmology resident.    Aram Robertson MD  PGY2, Ophthalmology Resident  03/13/2022  8:36 PM

## 2022-03-13 NOTE — PLAN OF CARE
POC reviewed with pt, verbalized understanding. VSS on RA. AAOX4. Remains free of falls and injury. Ambulates independently.     - awaiting urine for urinalysis   - no incisions or drains   - MRA today    Tolerating diet, denies nausea, prn sliding scale insulin given. Denies pain and headache. ACCU ACHS. Patient denies chest pain & SOB. No acute events or distress noted. Bed in lowest position, call light in reach, frequent rounds made for safety.     WCTM.

## 2022-03-13 NOTE — ASSESSMENT & PLAN NOTE
- ESR and CRP elevated in setting of persistent temporal headaches  - Ophthamology consulted  ;  Appreciate recs  - Rheumatology consulted (to offically eval in am) appreciate recs  - Vascular surgery consulted to consider biopsy  - follow up MRA GCA protocol  - follow up US temporal and axillary arteries  - Start Solumedrol 1g daily for three doses  - protonix 40mg po daily while on steroids  - accuchecks ACHS and will start SSI while on high dose steroids  - pain control  - further management pending clinical course and future study review

## 2022-03-13 NOTE — ED PROVIDER NOTES
Encounter Date: 3/12/2022    SCRIBE #1 NOTE: I, Sundeep Mccurdy, am scribing for, and in the presence of,  Haily Hamilton MD. I have scribed the following portions of the note - Other sections scribed: HPI, ROS.       History     Chief Complaint   Patient presents with    Headache     Intermittently for about a month, denies injury, denies blurred vision and dizziness     Time patient was seen by the provider: 6:09 PM      The patient is a 66 y.o. female with a past medical history of HTN and edema who presents to the ED with a complaint of intermittent R headaches that have been occurring for about a month. She states her headaches were initially located at her left temple. However, she states her headache has moved to her right temple. She describes the headache at her left temple as throbbing. She states applying pressure to her temples alleviates the pain her headaches cause. She states using tylenol and ibuprofen also alleviates her headaches. She states she had a headache located at her right temple earlier today. A family member claims she was nauseous during the episode. She states coughing and sneezing makes her feel the headache more. She states she took a tylenol earlier today after her headache occurred, and she currently does not have a headache. She states she does not have a history of getting headaches. She states she has had sinus issues in the past, but she has not any since having COVID-19, which was a while ago. Denies photophobia, visual disturbance, ear pain, congestion, sore throat, dysuria, leg swelling, neck stiffness, dizziness, weakness, numbness, or ever having shingles.    The history is provided by the patient, medical records and a relative. No  was used.     Review of patient's allergies indicates:  No Known Allergies  Past Medical History:   Diagnosis Date    Edema     HTN (hypertension)      History reviewed. No pertinent surgical history.  Family  History   Problem Relation Age of Onset    Diabetes Mother     Hypertension Mother     Cancer Mother     Coronary artery disease Mother     Stroke Father      Social History     Tobacco Use    Smoking status: Never Smoker    Smokeless tobacco: Never Used   Substance Use Topics    Alcohol use: No    Drug use: No     Review of Systems   Constitutional: Negative for fever.   HENT: Negative for congestion, ear pain and sore throat.    Eyes: Negative for photophobia and visual disturbance.   Cardiovascular: Negative for leg swelling.   Gastrointestinal: Positive for nausea (with headache episode).   Endocrine: Negative for polydipsia and polyuria.        +dry mouth   Genitourinary: Negative for dysuria.   Musculoskeletal: Negative for neck stiffness.   Allergic/Immunologic: Negative for immunocompromised state.   Neurological: Positive for headaches. Negative for dizziness, weakness and numbness.   Hematological: Does not bruise/bleed easily.   Psychiatric/Behavioral: Negative for suicidal ideas.   All other systems reviewed and are negative.      Physical Exam     Initial Vitals   BP Pulse Resp Temp SpO2   03/12/22 1801 03/12/22 1801 03/12/22 1801 03/12/22 1803 03/12/22 1801   (!) 150/85 78 18 98.6 °F (37 °C) 99 %      MAP       --                Physical Exam    Nursing note and vitals reviewed.  Constitutional: She appears well-developed and well-nourished. She is not diaphoretic. No distress.   HENT:   Head: Normocephalic and atraumatic.   Right Ear: External ear normal.   Left Ear: External ear normal.   R temple very mildly tender, non-tender left temple   Eyes: Conjunctivae, EOM and lids are normal. Pupils are equal, round, and reactive to light.   Neck: Trachea normal. Neck supple.   Normal range of motion.   Full passive range of motion without pain.     Cardiovascular: Normal rate and regular rhythm.   Pulmonary/Chest: No respiratory distress.   Abdominal: Abdomen is soft. She exhibits no distension.  There is no abdominal tenderness.   Musculoskeletal:         General: Normal range of motion.      Cervical back: Full passive range of motion without pain, normal range of motion and neck supple.     Neurological: She is alert and oriented to person, place, and time. GCS score is 15. GCS eye subscore is 4. GCS verbal subscore is 5. GCS motor subscore is 6.   Skin: Skin is warm, dry and intact.   Psychiatric: She has a normal mood and affect. Her speech is normal and behavior is normal. Judgment and thought content normal.         ED Course   Procedures  Labs Reviewed   COMPREHENSIVE METABOLIC PANEL - Abnormal; Notable for the following components:       Result Value    CO2 21 (*)     ALT 9 (*)     All other components within normal limits   SEDIMENTATION RATE - Abnormal; Notable for the following components:    Sed Rate 56 (*)     All other components within normal limits   C-REACTIVE PROTEIN - Abnormal; Notable for the following components:    CRP 12.9 (*)     All other components within normal limits   CBC W/ AUTO DIFFERENTIAL   MAGNESIUM   SARS-COV-2 RDRP GENE          Imaging Results          MRA Head for Temporal Arterites w and w/o Contrast (Final result)  Result time 03/13/22 13:03:17    Final result by Dwayne Rebolledo DO (03/13/22 13:03:17)                 Impression:      No evidence for significant mural thickening of the visualized superficial temporal arteries bilaterally to suggest active arteritis.    Otherwise unremarkable MRA of the head as detailed above      Electronically signed by: Dwayne Rebolledo DO  Date:    03/13/2022  Time:    13:03             Narrative:    EXAMINATION:  MRA HEAD FOR TEMPORAL ARTERITES W & W/O CONTRAST    CLINICAL HISTORY:  temporal arteritis;    TECHNIQUE:  Postcontrast 3D time-of-flight MRA of the head with 3 dimensional MIP reformats.  In addition axial 3 mm T1 high-resolution fat saturated postcontrast imaging of the head/skull base were performed, for evaluation of the  temporal arteries temporal arteritis protocol.  Ten mL of Gadavist was infused intravenously.    COMPARISON:  None    FINDINGS:  MRA head:    Anterior circulation:  The bilateral distal cervical, Jeffery, cavernous and supraclinoid segments of the ICA's and the visualized bilateral anterior and middle cerebral arteries are patent without evidence for significant focal stenosis or aneurysm.    Posterior circulation: Distal vertebral arteries not included in the field of view.  The basilar artery and posterior cerebral arteries are patent without significant focal stenosis or posterior circulation aneurysm.  There are bilateral posterior communicating arteries.    Study slightly distorted by patient motion artifacts.  The superficial temporal arteries are identified bilaterally the right being slightly more robust than the left.  There is no evidence for increased mural thickening bilaterally to suggest underlying giant cell arteritis.                               CT Head Without Contrast (Final result)  Result time 03/12/22 19:35:15    Final result by Sanford Khan MD (03/12/22 19:35:15)                 Impression:      No acute intracranial findings.      Electronically signed by: Sanford Khan  Date:    03/12/2022  Time:    19:35             Narrative:    EXAMINATION:  CT HEAD WITHOUT CONTRAST    CLINICAL HISTORY:  Headache, new or worsening (Age >= 50y);    TECHNIQUE:  Low dose axial images were obtained through the head.  Coronal and sagittal reformations were also performed. Contrast was not administered.    COMPARISON:  None.    FINDINGS:  Blood: No acute intracranial hemorrhage.    Parenchyma: No definite loss of gray-white differentiation to suggest acute or subacute transcortical infarct. Generalized pattern age-related parenchymal volume loss.  Nonspecific areas of white matter hypoattenuation may relate to sequela of chronic small vessel ischemic disease.    Ventricles/Extra-axial spaces: No abnormal  extra-axial fluid collection. Basal cisterns are patent.    Vessels: Vascular calcifications    Orbits: Unremarkable.    Scalp: Unremarkable.    Skull: There are no depressed skull fractures or destructive bone lesions.    Sinuses and mastoids: Scattered relatively minor paranasal sinus mucosal thickening.  Suggested osteoma left ethmoidal air cells.    Other findings: None                                 Medications   pantoprazole EC tablet 40 mg (40 mg Oral Given 3/13/22 4397)   methylPREDNISolone sodium succinate (SOLU-MEDROL) 1,000 mg in dextrose 5 % 100 mL IVPB (has no administration in time range)   sodium chloride 0.9% flush 10 mL (has no administration in time range)   naloxone 0.4 mg/mL injection 0.02 mg (has no administration in time range)   glucose chewable tablet 16 g (has no administration in time range)   glucose chewable tablet 24 g (has no administration in time range)   glucagon (human recombinant) injection 1 mg (has no administration in time range)   heparin (porcine) injection 5,000 Units (5,000 Units Subcutaneous Given 3/13/22 2109)   acetaminophen tablet 1,000 mg (has no administration in time range)   oxyCODONE immediate release tablet 5 mg (has no administration in time range)   ondansetron injection 4 mg (has no administration in time range)   insulin aspart U-100 pen 0-5 Units (1 Units Subcutaneous Given 3/13/22 2109)   albuterol-ipratropium 2.5 mg-0.5 mg/3 mL nebulizer solution 3 mL (has no administration in time range)   melatonin tablet 6 mg (has no administration in time range)   aluminum-magnesium hydroxide-simethicone 200-200-20 mg/5 mL suspension 30 mL (has no administration in time range)   dextrose 10% bolus 125 mL (has no administration in time range)   dextrose 10% bolus 250 mL (has no administration in time range)   hydrALAZINE tablet 25 mg (has no administration in time range)   potassium, sodium phosphates 280-160-250 mg packet 1 packet (1 packet Oral Given 3/13/22 2110)    methylPREDNISolone sodium succinate (SOLU-MEDROL) 1,000 mg in dextrose 5 % 100 mL IVPB (0 mg Intravenous Stopped 3/13/22 0027)   gadobutroL (GADAVIST) injection 10 mL (10 mLs Intravenous Given 3/13/22 1201)     Medical Decision Making:   History:   Old Medical Records: I decided to obtain old medical records.  Differential Diagnosis:   GCA, migraine headache, ICH, tension HA, cluster HA, trigeminal neuralgia, malignancy  Clinical Tests:   Lab Tests: Ordered and Reviewed  Radiological Study: Ordered and Reviewed  ED Management:  Give age, new onset HA, location of HA with elevated ESR and CRP concern for GCA  Discussed with optho, rheum  Patient will be admitted to hospital medicine for iv high dose steroids, MRA, temporal artery US, rheum consult  Updated pt and daughter who are in agreement with the plan  Other:   I have discussed this case with another health care provider.       <> Summary of the Discussion: opthalmology           Scribe Attestation:   Scribe #1: I performed the above scribed service and the documentation accurately describes the services I performed. I attest to the accuracy of the note.        ED Course as of 03/13/22 2324   Sat Mar 12, 2022   2044 Discussed with ophtho- he recommends getting rheumo involved and vascular surgery for biopsy [GK]      ED Course User Index  [GK] Haily Hamilton MD             Clinical Impression:   Final diagnoses:  [M31.6] GCA (giant cell arteritis)          ED Disposition Condition    Admit               Haily Hamilton MD  03/13/22 2324

## 2022-03-13 NOTE — SUBJECTIVE & OBJECTIVE
Current Facility-Administered Medications   Medication Frequency    acetaminophen tablet 1,000 mg Q8H PRN    albuterol-ipratropium 2.5 mg-0.5 mg/3 mL nebulizer solution 3 mL Q6H PRN    aluminum-magnesium hydroxide-simethicone 200-200-20 mg/5 mL suspension 30 mL QID PRN    dextrose 10% bolus 125 mL PRN    dextrose 10% bolus 250 mL PRN    glucagon (human recombinant) injection 1 mg PRN    glucose chewable tablet 16 g PRN    glucose chewable tablet 24 g PRN    heparin (porcine) injection 5,000 Units Q8H    hydrALAZINE tablet 25 mg Q8H PRN    insulin aspart U-100 pen 0-5 Units QID (AC + HS) PRN    melatonin tablet 6 mg Nightly PRN    [START ON 3/14/2022] methylPREDNISolone sodium succinate (SOLU-MEDROL) 1,000 mg in dextrose 5 % 100 mL IVPB Daily    naloxone 0.4 mg/mL injection 0.02 mg PRN    ondansetron injection 4 mg Q8H PRN    oxyCODONE immediate release tablet 5 mg Q6H PRN    pantoprazole EC tablet 40 mg Daily    potassium, sodium phosphates 280-160-250 mg packet 1 packet QID (AC & HS)    sodium chloride 0.9% flush 10 mL Q12H PRN     Objective:     Vital Signs (Most Recent):  Temp: 96.5 °F (35.8 °C) (03/13/22 0737)  Pulse: 92 (03/13/22 0737)  Resp: 17 (03/13/22 0737)  BP: (!) 143/65 (03/13/22 0737)  SpO2: (!) 91 % (03/13/22 0737)  O2 Device (Oxygen Therapy): room air (03/13/22 0737)   Vital Signs (24h Range):  Temp:  [96.5 °F (35.8 °C)-99 °F (37.2 °C)] 96.5 °F (35.8 °C)  Pulse:  [68-92] 92  Resp:  [16-18] 17  SpO2:  [91 %-99 %] 91 %  BP: (132-152)/(62-85) 143/65     Weight: 95.3 kg (210 lb) (03/13/22 0113)  Body mass index is 36.05 kg/m².  Body surface area is 2.07 meters squared.      Intake/Output Summary (Last 24 hours) at 3/13/2022 1047  Last data filed at 3/13/2022 0858  Gross per 24 hour   Intake 300 ml   Output 300 ml   Net 0 ml       Physical Exam   Constitutional: She is oriented to person, place, and time. She appears obese. She does not appear ill. No distress.   HENT:   Head: Normocephalic and  atraumatic.   Nose: Nose normal.   Mouth/Throat: Mucous membranes are moist.   Eyes: Pupils are equal, round, and reactive to light. Conjunctivae are normal.   Cardiovascular: Normal rate, regular rhythm, normal heart sounds and normal pulses.   Pulmonary/Chest: Effort normal. No respiratory distress. She has no wheezes.   Abdominal: Soft. Normal appearance.   Musculoskeletal:         General: No swelling or tenderness. Normal range of motion.   Neurological: She is alert and oriented to person, place, and time. She displays no weakness. No cranial nerve deficit.   Skin: Skin is warm and dry. Capillary refill takes less than 2 seconds. No jaundice.   Psychiatric: Her behavior is normal. Mood, judgment and thought content normal.     Significant Labs:  CBC:   Recent Labs   Lab 03/12/22  1831 03/13/22  0521   WBC 7.80 8.02   HGB 13.8 13.1   HCT 43.1 41.0    283     CMP:   Recent Labs   Lab 03/13/22  0521   *   CALCIUM 9.7   ALBUMIN 3.7   PROT 7.8      K 3.9   CO2 21*      BUN 17   CREATININE 0.9   ALKPHOS 108   ALT 9*   AST 11   BILITOT 0.4       Significant Imaging:  Imaging results within the past 24 hours have been reviewed.

## 2022-03-13 NOTE — ASSESSMENT & PLAN NOTE
New onset temporal HA over the last month and ESR and CRP elevated in setting of persistent temporal headaches raised suspicion for GCA. MRA GCA protocol without any abnormalities.   - Ophthamology consulted  ;  Appreciate recs  - Rheumatology consulted and will see patient   - Vascular surgery consulted to consider biopsy  - follow up US temporal and axillary arteries  - Solumedrol 1g daily for three doses  - protonix 40mg po daily while on steroids  - accuchecks ACHS and will start SSI while on high dose steroids  - pain control  - further management pending clinical course and future study review

## 2022-03-13 NOTE — PLAN OF CARE
END OF SHIFT/PLAN OF CARE NOTE    SHIFT: [id number]    PLAN: Continue current plan of care    SIGNIFICANT EVENTS: Arrival to [facility] from ER     IV: R HD 20 g S/L    DRAINS: none    OXYGEN: N/A     SAFETY: Ambulatory, instructed to call for assistance if family is not at bedside. Patient reports no dizziness associated with headaches. Call light in reach. Bed locked in lowest position. Side rails up x2.    PRN MEDS: none given, patient reports no headache since arrival to floor from ER. Hydralazine PRN SBP > 180        OTHER NOTES: No significant events since admit to floor.  at bedside. IV solumedrol sent up from ER with patient and administered upon arrival around 0000. No issues to report at this time. Monitoring BP, elevated but WNL.

## 2022-03-13 NOTE — CONSULTS
Consultation Report  Ophthalmology Service    Date: 03/12/2022    Chief complaint/Reason for Consult: GCA r/o     History of Present Illness: Rufus Carpenter is a 66 y.o. female who presents with 1 day of right sided temporal headache. States 4 days ago had headache on left side. Denies vision changes. Denies jaw claudication, arthralgias, fevers/chills, or weight loss.     POcularHx: glasses    Current eye gtts: none      PMHx:  has a past medical history of Edema and HTN (hypertension).     PSurgHx:  has no past surgical history on file.     Home Medications:   Prior to Admission medications    Not on File        Medications this encounter:     Allergies: has No Known Allergies.     Social:  reports that she has never smoked. She has never used smokeless tobacco. She reports that she does not drink alcohol and does not use drugs.     Family Hx: No family history of glaucoma, macular degeneration, or blindness. family history includes Cancer in her mother; Coronary artery disease in her mother; Diabetes in her mother; Hypertension in her mother; Stroke in her father.     ROS: Negative x 10 except for complaints as described in HPI; negative for fever, chills, weight loss, nausea, vomiting, diarrhea, shortness of breath, nasal discharge, cough, abdominal pain, dyspnea, difficulty moving arms and legs, confusion, dysuria, palpitations, or chest pain     Ocular examination/Dilated fundus examination:  Base Eye Exam     Visual Acuity (Snellen - Linear)       Right Left    Dist sc 20/40 20/40          Tonometry (Tonopen, 8:31 PM)       Right Left    Pressure 14 15          Pupils       Dark Light Shape React APD    Right 3 2 Round Brisk None    Left 3 2 Round Brisk None          Visual Fields       Right Left     Full Full          Extraocular Movement       Right Left     Full, Ortho Full, Ortho            Slit Lamp and Fundus Exam     External Exam       Right Left    External Normal Normal          Slit Lamp Exam        Right Left    Lids/Lashes Normal Normal    Conjunctiva/Sclera White and quiet White and quiet    Cornea Clear Clear    Anterior Chamber Deep and quiet Deep and quiet    Iris Round and reactive Round and reactive    Lens 1+ Nuclear sclerosis, 1+ Cortical cataract 1+ Nuclear sclerosis, 1+ Cortical cataract    Anterior Vitreous Posterior vitreous detachment Normal          Fundus Exam       Right Left    Disc Normal Normal    C/D Ratio 0.2 0.2    Macula Normal Normal    Vessels Normal Normal    Periphery flat w ST CRS Normal                Assessment/Plan:   1. GCA R/O  - pt w right sided temporal headache x 1 day  - ESR elevated to 56, CRP elevated to 12.9, platelets wnl  - VA 20/40 OU, EOMI, no APD, IOP wnl, Color intact  - anterior exam with NSC, CC  - posterior exam without disc edema or other abnormality  - in setting of temporal headache + inflammatory markers believe it is reasonable to start treatment for suspected GCA. However, in absence of systemic symptoms, believe treatment could be done outpatient, but will defer to primary.    Recs  - consider inpatient admission to hospital medicine for IV steroids vs outpatient oral course of prednisone. Would recommend 60 mg daily x 2 weeks w PPI for GI protection if outpatient  - consider outpatient referral to vascular surgery within the next week for temporal artery biopsy  - ophthalmology to follow up outpatient in 1-2 weeks or sooner PRN      Discussed patient's history, physical, assessment and plan with Dr Witt.  If there are further questions, please page the on call ophthalmology resident.    Aram Robertson MD  PGY2, Ophthalmology Resident  03/12/2022  8:36 PM

## 2022-03-13 NOTE — NURSING
Nursing Transfer Note      3/12/2022     Reason patient is being transferred: Coming from ER for inpatient admit on unit    Transfer To: [facility] from ER    Transfer via wheelchair: Wheelchair    Transfer with: Daughter and  with patient    Transported by: Transport staff    Medicines sent: IV solumedrol to be given    Any special needs or follow-up needed: no    Chart send with patient: Yes    Notified: On call physician Dr. Alicia     Patient reassessed at: 3/12/22 at 2350    Upon arrival to floor: Arrived to floor at 2330

## 2022-03-13 NOTE — HPI
Patient is a 66 year old female with HTN and obesity who was admitted for bilateral temporal pain. She states the pain increases with coughing or sneezing and is relieved when she applies pressure to the site of pain. She denies any vision changes. She has no other complaints.

## 2022-03-13 NOTE — H&P
[facility] Medicine  History & Physical    Patient Name: Rufus Carpenter  MRN: [id number]  Patient Class: IP- Inpatient  Admission Date: 3/12/2022  Attending Physician: Yenifer Pugh MD   Primary Care Provider: Naty Sanon MD         Patient information was obtained from patient, past medical records and ER records.     Subjective:     Principal Problem:GCA (giant cell arteritis)    Chief Complaint:   Chief Complaint   Patient presents with    Headache     Intermittently for about a month, denies injury, denies blurred vision and dizziness        HPI: The patient is a 66 y.o. female with a past medical history of HTN and edema who presents to the ED with a complaint of intermittent R headaches that have been occurring for about a month. She states her headaches were initially located at her left temple. However, she states her headache has moved to her right temple. She describes the headache at her left temple as throbbing. She states applying pressure to her temples alleviates the pain her headaches cause. She states using tylenol and ibuprofen also alleviates her headaches. She states she had a headache located at her right temple earlier today. A family member claims she was nauseous during the episode. She states coughing and sneezing makes her feel the headache more. She states she took a tylenol earlier today after her headache occurred, and she currently does not have a headache. She states she does not have a history of getting headaches. She states she has had sinus issues in the past, but she has not any since having COVID-19, which was a while ago. Denies photophobia, visual disturbance, ear pain, congestion, sore throat, dysuria, leg swelling, neck stiffness, dizziness, weakness, numbness, or ever having shingles    In the ED patient afebrile and hemodynamically stable saturating well on room air. ESR and CRP elevated. Ophthalmology consulted and evaluated patient and felt  in setting of temporal headache and elevated inflammatory markers recommended initiating treatment for GCA. ED discussed with rheumatology as well who recommended admission for further evaluation and iv steroids. Patient admitted to the care of medicine for further evaluation and management.      Past Medical History:   Diagnosis Date    Edema     HTN (hypertension)        History reviewed. No pertinent surgical history.    Review of patient's allergies indicates:  No Known Allergies    No current facility-administered medications on file prior to encounter.     No current outpatient medications on file prior to encounter.     Family History       Problem Relation (Age of Onset)    Cancer Mother    Coronary artery disease Mother    Diabetes Mother    Hypertension Mother    Stroke Father          Tobacco Use    Smoking status: Never Smoker    Smokeless tobacco: Never Used   Substance and Sexual Activity    Alcohol use: No    Drug use: No    Sexual activity: Not on file     Review of Systems   Constitutional:  Negative for chills, fatigue and fever.   HENT:  Negative for rhinorrhea, sore throat, tinnitus and trouble swallowing.    Eyes:  Negative for photophobia and visual disturbance.   Respiratory:  Negative for cough, shortness of breath and wheezing.    Cardiovascular:  Negative for chest pain, palpitations and leg swelling.   Gastrointestinal:  Negative for abdominal pain, diarrhea, nausea and vomiting.   Genitourinary:  Negative for dysuria and hematuria.   Musculoskeletal:  Negative for neck pain and neck stiffness.   Skin:  Negative for rash and wound.   Neurological:  Positive for headaches. Negative for syncope, weakness and numbness.   Psychiatric/Behavioral:  Negative for confusion and decreased concentration.    Objective:     Vital Signs (Most Recent):  Temp: 98.2 °F (36.8 °C) (03/13/22 0018)  Pulse: 87 (03/13/22 0018)  Resp: 18 (03/13/22 0018)  BP: (!) 141/64 (03/13/22 0018)  SpO2: 97 % (03/13/22  0018)   Vital Signs (24h Range):  Temp:  [98.2 °F (36.8 °C)-98.6 °F (37 °C)] 98.2 °F (36.8 °C)  Pulse:  [68-87] 87  Resp:  [18] 18  SpO2:  [96 %-99 %] 97 %  BP: (134-150)/(62-85) 141/64     Weight: 95.3 kg (210 lb)  Body mass index is 36.05 kg/m².    Physical Exam  Constitutional:       General: She is not in acute distress.     Appearance: She is not toxic-appearing or diaphoretic.   HENT:      Head: Normocephalic and atraumatic.      Nose: Nose normal.   Eyes:      General: No scleral icterus.     Extraocular Movements: Extraocular movements intact.      Pupils: Pupils are equal, round, and reactive to light.   Cardiovascular:      Rate and Rhythm: Normal rate and regular rhythm.   Pulmonary:      Effort: Pulmonary effort is normal. No respiratory distress.      Breath sounds: No wheezing or rales.   Abdominal:      General: Abdomen is flat. There is no distension.      Palpations: Abdomen is soft.      Tenderness: There is no abdominal tenderness. There is no guarding.   Musculoskeletal:         General: Normal range of motion.      Cervical back: Normal range of motion and neck supple. No rigidity.      Right lower leg: No edema.      Left lower leg: No edema.   Skin:     General: Skin is warm and dry.      Coloration: Skin is not jaundiced.   Neurological:      General: No focal deficit present.      Mental Status: She is alert and oriented to person, place, and time.      Cranial Nerves: No cranial nerve deficit.   Psychiatric:         Mood and Affect: Mood normal.         Behavior: Behavior normal.         CRANIAL NERVES     CN III, IV, VI   Pupils are equal, round, and reactive to light.     Significant Labs: All pertinent labs within the past 24 hours have been reviewed.  CBC:   Recent Labs   Lab 03/12/22  1831   WBC 7.80   HGB 13.8   HCT 43.1        CMP:   Recent Labs   Lab 03/12/22  1831      K 4.6      CO2 21*   GLU 96   BUN 14   CREATININE 0.8   CALCIUM 9.4   PROT 8.3   ALBUMIN 3.8    BILITOT 0.3   ALKPHOS 112   AST 15   ALT 9*   ANIONGAP 14   EGFRNONAA >60.0       Significant Imaging: I have reviewed all pertinent imaging results/findings within the past 24 hours.    Assessment/Plan:     * GCA (giant cell arteritis)  - ESR and CRP elevated in setting of persistent temporal headaches  - Ophthamology consulted  ;  Appreciate recs  - Rheumatology consulted (to offically eval in am) appreciate recs  - Vascular surgery consulted to consider biopsy  - follow up MRA GCA protocol  - follow up US temporal and axillary arteries  - Start Solumedrol 1g daily for three doses  - protonix 40mg po daily while on steroids  - accuchecks ACHS and will start SSI while on high dose steroids  - pain control  - further management pending clinical course and future study review      HTN (hypertension)  - controlled by diet  - hydralazine prn        VTE Risk Mitigation (From admission, onward)         Ordered     heparin (porcine) injection 5,000 Units  Every 8 hours         03/12/22 2152     IP VTE HIGH RISK PATIENT  Once         03/12/22 2152     Place sequential compression device  Until discontinued         03/12/22 2152                   Kashmir Morin MD  Department of Hospital Medicine   [facility]

## 2022-03-13 NOTE — SUBJECTIVE & OBJECTIVE
Interval History: feels well this am and at her baseline. No current HA.  No nausea, vomiting, dyspnea, constipation. Sugar is up but really likes to drink sweet tea and cokes. Slept well last night. Daughter (peds nurse) on the phone and  at bedside during interview. Had not yet seen rheumatology but did see vascular this am.  All questions answered    Review of Systems   Constitutional:  Negative for fatigue.   Respiratory:  Negative for cough, chest tightness and shortness of breath.    Cardiovascular:  Negative for chest pain.   Gastrointestinal:  Negative for constipation, nausea and vomiting.   Genitourinary:  Negative for dysuria.   Neurological:  Positive for headaches.   All other systems reviewed and are negative.  Objective:     Vital Signs (Most Recent):  Temp: 96 °F (35.6 °C) (03/13/22 1124)  Pulse: 98 (03/13/22 1124)  Resp: 18 (03/13/22 1124)  BP: (!) 156/70 (03/13/22 1124)  SpO2: (!) 94 % (03/13/22 1124)   Vital Signs (24h Range):  Temp:  [96 °F (35.6 °C)-99 °F (37.2 °C)] 96 °F (35.6 °C)  Pulse:  [68-98] 98  Resp:  [16-18] 18  SpO2:  [91 %-99 %] 94 %  BP: (132-156)/(62-85) 156/70     Weight: 95.3 kg (210 lb)  Body mass index is 36.05 kg/m².    Intake/Output Summary (Last 24 hours) at 3/13/2022 1220  Last data filed at 3/13/2022 0858  Gross per 24 hour   Intake 300 ml   Output 300 ml   Net 0 ml      Physical Exam  Constitutional:       General: She is not in acute distress.     Appearance: She is not toxic-appearing or diaphoretic.   HENT:      Head: Normocephalic and atraumatic.      Comments: No tenderness over either temporal artery     Nose: Nose normal.   Eyes:      General: No scleral icterus.     Extraocular Movements: Extraocular movements intact.      Pupils: Pupils are equal, round, and reactive to light.   Cardiovascular:      Rate and Rhythm: Normal rate and regular rhythm.   Pulmonary:      Effort: Pulmonary effort is normal. No respiratory distress.      Breath sounds: No wheezing  or rales.   Abdominal:      General: Abdomen is flat. There is no distension.      Palpations: Abdomen is soft.      Tenderness: There is no abdominal tenderness. There is no guarding.   Musculoskeletal:         General: Normal range of motion.      Cervical back: Normal range of motion and neck supple. No rigidity.      Right lower leg: No edema.      Left lower leg: No edema.   Skin:     General: Skin is warm and dry.      Coloration: Skin is not jaundiced.   Neurological:      General: No focal deficit present.      Mental Status: She is alert and oriented to person, place, and time.      Cranial Nerves: No cranial nerve deficit.   Psychiatric:         Mood and Affect: Mood normal.         Behavior: Behavior normal.       Significant Labs: All pertinent labs within the past 24 hours have been reviewed.    Significant Imaging: I have reviewed all pertinent imaging results/findings within the past 24 hours.

## 2022-03-13 NOTE — HPI
The patient is a 66 y.o. female with a past medical history of HTN and edema who presents to the ED with a complaint of intermittent R headaches that have been occurring for about a month. She states her headaches were initially located at her left temple. However, she states her headache has moved to her right temple. She describes the headache at her left temple as throbbing. She states applying pressure to her temples alleviates the pain her headaches cause. She states using tylenol and ibuprofen also alleviates her headaches. She states she had a headache located at her right temple earlier today. A family member claims she was nauseous during the episode. She states coughing and sneezing makes her feel the headache more. She states she took a tylenol earlier today after her headache occurred, and she currently does not have a headache. She states she does not have a history of getting headaches. She states she has had sinus issues in the past, but she has not any since having COVID-19, which was a while ago. Denies photophobia, visual disturbance, ear pain, congestion, sore throat, dysuria, leg swelling, neck stiffness, dizziness, weakness, numbness, or ever having shingles    In the ED patient afebrile and hemodynamically stable saturating well on room air. ESR and CRP elevated. Ophthalmology consulted and evaluated patient and felt in setting of temporal headache and elevated inflammatory markers recommended initiating treatment for GCA. ED discussed with rheumatology as well who recommended admission for further evaluation and iv steroids. Patient admitted to the care of medicine for further evaluation and management.

## 2022-03-13 NOTE — HPI
66 year old AAF with no significant PMH presents to the ED with a complaint of intermittent headaches that started about a month ago. She states her headaches were initially started on the left side. She describes the headache as throbbing and relieved with OTC meds. She states using tylenol and ibuprofen also alleviates her headaches. She states the HA on her right side started on Thursday. She states she does not have a history of getting headaches. She states she has had sinus issues in the past, but she has not any since having COVID-19, which was a while ago.     Denies photophobia, visual disturbance, ear pain, congestion, sore throat, dysuria, leg swelling, neck stiffness, dizziness, weakness, numbness, or ever having shingles     In the ED patient afebrile and hemodynamically stable saturating well on room air. ESR and CRP elevated. Ophthalmology consulted and evaluated patient and felt in setting of temporal headache and elevated inflammatory markers recommended initiating treatment for GCA. ED discussed with rheumatology as well who recommended admission for further evaluation and iv steroids. Rheumatology consulted for assistance.     Patients denies any Rheum ROS. No family history of any autoimmune conditions.

## 2022-03-13 NOTE — ED NOTES
"Pt presents to the ED via personal vehicle with a family member. AAOx4. RR even, unlabored, and spontaneous. Pt states that she started having painful headaches in February and they have gotten progressively worse. Pt reports nausea and vomiting with one recent headache. Pt also reports feeling dizzy and weak. Pt denies SOB, cough, fever, and chills. Pt reports that headaches started on the left side but are now predominantly on the right side. Pt states that headaches are "throbbing and feel like a pressure when you have a cold."  "

## 2022-03-13 NOTE — CONSULTS
[facility] - [facility]  Vascular Surgery  Consult Note    Inpatient consult to Vascular Surgery  Consult performed by: Jesenia Bach MD  Consult ordered by: Kashmir Morin MD  Reason for consult: headahce  Assessment/Recommendations: Patient with headaches and elevated ESR of 56. No vision changes, no temporal artery tenderness. MRA and TCD pending. Currently receiving high dose steroids. No inpatient surgery planned. We will arrange follow up in outpatient vascular surgery clinic within 1 week for biopsy.        Subjective:     Chief Complaint/Reason for Admission: headache    History of Present Illness: Patient is a 66 year old female with HTN and obesity who was admitted for bilateral temporal pain. She states the pain increases with coughing or sneezing and is relieved when she applies pressure to the site of pain. She denies any vision changes. She has no other complaints.      No medications prior to admission.       Review of patient's allergies indicates:  No Known Allergies    Past Medical History:   Diagnosis Date    Edema     HTN (hypertension)      History reviewed. No pertinent surgical history.  Family History       Problem Relation (Age of Onset)    Cancer Mother    Coronary artery disease Mother    Diabetes Mother    Hypertension Mother    Stroke Father          Tobacco Use    Smoking status: Never Smoker    Smokeless tobacco: Never Used   Substance and Sexual Activity    Alcohol use: No    Drug use: No    Sexual activity: Not on file     Review of Systems   Constitutional:  Negative for appetite change and fatigue.   Eyes:  Negative for visual disturbance.   Respiratory:  Negative for shortness of breath.    Cardiovascular:  Negative for chest pain.   Gastrointestinal:  Negative for abdominal pain.   Endocrine: Negative for polyuria.   Genitourinary:  Negative for dysuria.   Musculoskeletal:  Negative for arthralgias.   Skin:  Negative for color change.   Neurological:  Positive for  headaches. Negative for dizziness, syncope, speech difficulty and weakness.   Psychiatric/Behavioral:  Negative for confusion.    Objective:     Vital Signs (Most Recent):  Temp: 96.5 °F (35.8 °C) (03/13/22 0737)  Pulse: 92 (03/13/22 0737)  Resp: 17 (03/13/22 0737)  BP: (!) 143/65 (03/13/22 0737)  SpO2: (!) 91 % (03/13/22 0737)   Vital Signs (24h Range):  Temp:  [96.5 °F (35.8 °C)-99 °F (37.2 °C)] 96.5 °F (35.8 °C)  Pulse:  [68-92] 92  Resp:  [16-18] 17  SpO2:  [91 %-99 %] 91 %  BP: (132-152)/(62-85) 143/65     Weight: 95.3 kg (210 lb)  Body mass index is 36.05 kg/m².    Physical Exam  Vitals reviewed.   Constitutional:       General: She is not in acute distress.     Appearance: Normal appearance.   HENT:      Head: Normocephalic and atraumatic.      Nose: No congestion.      Mouth/Throat:      Mouth: Mucous membranes are moist.   Cardiovascular:      Rate and Rhythm: Normal rate and regular rhythm.      Pulses: Normal pulses.   Pulmonary:      Effort: Pulmonary effort is normal. No respiratory distress.   Abdominal:      General: There is no distension.      Palpations: Abdomen is soft.      Tenderness: There is no abdominal tenderness.   Musculoskeletal:         General: No swelling.   Skin:     General: Skin is warm.      Capillary Refill: Capillary refill takes less than 2 seconds.      Findings: No erythema.   Neurological:      General: No focal deficit present.      Mental Status: She is alert and oriented to person, place, and time.      Comments: No tenderness overlying the temporal artery bilaterally   Psychiatric:         Mood and Affect: Mood normal.         Behavior: Behavior normal.       Significant Labs:  All pertinent labs from the last 24 hours have been reviewed.    Significant Diagnostics:  I have reviewed all pertinent imaging results/findings within the past 24 hours.    Assessment/Plan:     Headache  Patient with headaches and elevated ESR of 56. No vision changes, no temporal artery  tenderness. MRA and TCD pending. Currently receiving high dose steroids. No inpatient surgery planned. We will arrange follow up in outpatient vascular surgery clinic within 1 week for biopsy.      Thank you for your consult. I will sign off. Please contact us if you have any additional questions.    Jesenia Bach MD  Vascular Surgery  [facility]

## 2022-03-13 NOTE — SUBJECTIVE & OBJECTIVE
No medications prior to admission.       Review of patient's allergies indicates:  No Known Allergies    Past Medical History:   Diagnosis Date    Edema     HTN (hypertension)      History reviewed. No pertinent surgical history.  Family History       Problem Relation (Age of Onset)    Cancer Mother    Coronary artery disease Mother    Diabetes Mother    Hypertension Mother    Stroke Father          Tobacco Use    Smoking status: Never Smoker    Smokeless tobacco: Never Used   Substance and Sexual Activity    Alcohol use: No    Drug use: No    Sexual activity: Not on file     Review of Systems   Constitutional:  Negative for appetite change and fatigue.   Eyes:  Negative for visual disturbance.   Respiratory:  Negative for shortness of breath.    Cardiovascular:  Negative for chest pain.   Gastrointestinal:  Negative for abdominal pain.   Endocrine: Negative for polyuria.   Genitourinary:  Negative for dysuria.   Musculoskeletal:  Negative for arthralgias.   Skin:  Negative for color change.   Neurological:  Positive for headaches. Negative for dizziness, syncope, speech difficulty and weakness.   Psychiatric/Behavioral:  Negative for confusion.    Objective:     Vital Signs (Most Recent):  Temp: 96.5 °F (35.8 °C) (03/13/22 0737)  Pulse: 92 (03/13/22 0737)  Resp: 17 (03/13/22 0737)  BP: (!) 143/65 (03/13/22 0737)  SpO2: (!) 91 % (03/13/22 0737)   Vital Signs (24h Range):  Temp:  [96.5 °F (35.8 °C)-99 °F (37.2 °C)] 96.5 °F (35.8 °C)  Pulse:  [68-92] 92  Resp:  [16-18] 17  SpO2:  [91 %-99 %] 91 %  BP: (132-152)/(62-85) 143/65     Weight: 95.3 kg (210 lb)  Body mass index is 36.05 kg/m².    Physical Exam  Vitals reviewed.   Constitutional:       General: She is not in acute distress.     Appearance: Normal appearance.   HENT:      Head: Normocephalic and atraumatic.      Nose: No congestion.      Mouth/Throat:      Mouth: Mucous membranes are moist.   Cardiovascular:      Rate and Rhythm: Normal rate and regular  rhythm.      Pulses: Normal pulses.   Pulmonary:      Effort: Pulmonary effort is normal. No respiratory distress.   Abdominal:      General: There is no distension.      Palpations: Abdomen is soft.      Tenderness: There is no abdominal tenderness.   Musculoskeletal:         General: No swelling.   Skin:     General: Skin is warm.      Capillary Refill: Capillary refill takes less than 2 seconds.      Findings: No erythema.   Neurological:      General: No focal deficit present.      Mental Status: She is alert and oriented to person, place, and time.      Comments: No tenderness overlying the temporal artery bilaterally   Psychiatric:         Mood and Affect: Mood normal.         Behavior: Behavior normal.       Significant Labs:  All pertinent labs from the last 24 hours have been reviewed.    Significant Diagnostics:  I have reviewed all pertinent imaging results/findings within the past 24 hours.

## 2022-03-14 PROBLEM — E11.9 TYPE 2 DIABETES MELLITUS WITHOUT COMPLICATION, WITHOUT LONG-TERM CURRENT USE OF INSULIN: Status: ACTIVE | Noted: 2022-03-14

## 2022-03-14 LAB
ALBUMIN SERPL BCP-MCNC: 3.5 G/DL (ref 3.5–5.2)
ALBUMIN SERPL ELPH-MCNC: 4.08 G/DL (ref 3.35–5.55)
ALP SERPL-CCNC: 108 U/L (ref 55–135)
ALPHA1 GLOB SERPL ELPH-MCNC: 0.33 G/DL (ref 0.17–0.41)
ALPHA2 GLOB SERPL ELPH-MCNC: 0.76 G/DL (ref 0.43–0.99)
ALT SERPL W/O P-5'-P-CCNC: 7 U/L (ref 10–44)
ANA SER QL IF: NORMAL
ANION GAP SERPL CALC-SCNC: 10 MMOL/L (ref 8–16)
AST SERPL-CCNC: 9 U/L (ref 10–40)
B-GLOBULIN SERPL ELPH-MCNC: 0.92 G/DL (ref 0.5–1.1)
BILIRUB SERPL-MCNC: 0.2 MG/DL (ref 0.1–1)
BUN SERPL-MCNC: 19 MG/DL (ref 8–23)
CALCIUM SERPL-MCNC: 9.3 MG/DL (ref 8.7–10.5)
CHLORIDE SERPL-SCNC: 107 MMOL/L (ref 95–110)
CO2 SERPL-SCNC: 21 MMOL/L (ref 23–29)
CREAT SERPL-MCNC: 0.8 MG/DL (ref 0.5–1.4)
EST. GFR  (AFRICAN AMERICAN): >60 ML/MIN/1.73 M^2
EST. GFR  (NON AFRICAN AMERICAN): >60 ML/MIN/1.73 M^2
ESTIMATED AVG GLUCOSE: 148 MG/DL (ref 68–131)
GAMMA GLOB SERPL ELPH-MCNC: 1.61 G/DL (ref 0.67–1.58)
GLUCOSE SERPL-MCNC: 175 MG/DL (ref 70–110)
HBA1C MFR BLD: 6.8 % (ref 4–5.6)
MAGNESIUM SERPL-MCNC: 1.9 MG/DL (ref 1.6–2.6)
PHOSPHATE SERPL-MCNC: 3.1 MG/DL (ref 2.7–4.5)
POCT GLUCOSE: 222 MG/DL (ref 70–110)
POCT GLUCOSE: 245 MG/DL (ref 70–110)
POCT GLUCOSE: 263 MG/DL (ref 70–110)
POCT GLUCOSE: 265 MG/DL (ref 70–110)
POTASSIUM SERPL-SCNC: 3.9 MMOL/L (ref 3.5–5.1)
PROT SERPL-MCNC: 7.3 G/DL (ref 6–8.4)
PROT SERPL-MCNC: 7.7 G/DL (ref 6–8.4)
SODIUM SERPL-SCNC: 138 MMOL/L (ref 136–145)

## 2022-03-14 PROCEDURE — 83735 ASSAY OF MAGNESIUM: CPT | Performed by: FAMILY MEDICINE

## 2022-03-14 PROCEDURE — 83036 HEMOGLOBIN GLYCOSYLATED A1C: CPT | Performed by: HOSPITALIST

## 2022-03-14 PROCEDURE — 99233 PR SUBSEQUENT HOSPITAL CARE,LEVL III: ICD-10-PCS | Mod: GC,,, | Performed by: INTERNAL MEDICINE

## 2022-03-14 PROCEDURE — 84100 ASSAY OF PHOSPHORUS: CPT | Performed by: FAMILY MEDICINE

## 2022-03-14 PROCEDURE — 94761 N-INVAS EAR/PLS OXIMETRY MLT: CPT

## 2022-03-14 PROCEDURE — 20600001 HC STEP DOWN PRIVATE ROOM

## 2022-03-14 PROCEDURE — 36415 COLL VENOUS BLD VENIPUNCTURE: CPT | Performed by: FAMILY MEDICINE

## 2022-03-14 PROCEDURE — 25000003 PHARM REV CODE 250: Performed by: FAMILY MEDICINE

## 2022-03-14 PROCEDURE — 99233 PR SUBSEQUENT HOSPITAL CARE,LEVL III: ICD-10-PCS | Mod: ,,, | Performed by: HOSPITALIST

## 2022-03-14 PROCEDURE — 63600175 PHARM REV CODE 636 W HCPCS: Performed by: FAMILY MEDICINE

## 2022-03-14 PROCEDURE — 99233 SBSQ HOSP IP/OBS HIGH 50: CPT | Mod: ,,, | Performed by: HOSPITALIST

## 2022-03-14 PROCEDURE — 25000003 PHARM REV CODE 250: Performed by: HOSPITALIST

## 2022-03-14 PROCEDURE — 80053 COMPREHEN METABOLIC PANEL: CPT | Performed by: FAMILY MEDICINE

## 2022-03-14 PROCEDURE — 99233 SBSQ HOSP IP/OBS HIGH 50: CPT | Mod: GC,,, | Performed by: INTERNAL MEDICINE

## 2022-03-14 RX ORDER — LANCETS 33 GAUGE
1 EACH MISCELLANEOUS 2 TIMES DAILY
Qty: 100 EACH | Refills: 0 | Status: SHIPPED | OUTPATIENT
Start: 2022-03-14

## 2022-03-14 RX ORDER — DEXTROSE 4 G
TABLET,CHEWABLE ORAL
Qty: 1 EACH | Refills: 0 | Status: SHIPPED | OUTPATIENT
Start: 2022-03-14

## 2022-03-14 RX ADMIN — HEPARIN SODIUM 5000 UNITS: 5000 INJECTION INTRAVENOUS; SUBCUTANEOUS at 01:03

## 2022-03-14 RX ADMIN — HEPARIN SODIUM 5000 UNITS: 5000 INJECTION INTRAVENOUS; SUBCUTANEOUS at 05:03

## 2022-03-14 RX ADMIN — INSULIN ASPART 3 UNITS: 100 INJECTION, SOLUTION INTRAVENOUS; SUBCUTANEOUS at 05:03

## 2022-03-14 RX ADMIN — HEPARIN SODIUM 5000 UNITS: 5000 INJECTION INTRAVENOUS; SUBCUTANEOUS at 09:03

## 2022-03-14 RX ADMIN — METHYLPREDNISOLONE SODIUM SUCCINATE 1000 MG: 1 INJECTION, POWDER, LYOPHILIZED, FOR SOLUTION INTRAMUSCULAR; INTRAVENOUS at 05:03

## 2022-03-14 RX ADMIN — PANTOPRAZOLE SODIUM 40 MG: 40 TABLET, DELAYED RELEASE ORAL at 09:03

## 2022-03-14 RX ADMIN — POTASSIUM & SODIUM PHOSPHATES POWDER PACK 280-160-250 MG 1 PACKET: 280-160-250 PACK at 06:03

## 2022-03-14 RX ADMIN — INSULIN ASPART 2 UNITS: 100 INJECTION, SOLUTION INTRAVENOUS; SUBCUTANEOUS at 12:03

## 2022-03-14 RX ADMIN — INSULIN ASPART 2 UNITS: 100 INJECTION, SOLUTION INTRAVENOUS; SUBCUTANEOUS at 09:03

## 2022-03-14 RX ADMIN — INSULIN ASPART 1 UNITS: 100 INJECTION, SOLUTION INTRAVENOUS; SUBCUTANEOUS at 09:03

## 2022-03-14 NOTE — SUBJECTIVE & OBJECTIVE
Interval History: Patient doing well today.  Denies any fever or chills.  No new visual symptoms.  Is headache free today.  Denies jaw pain or claudication.  Day 2/3 of Solumedrol 1000mg given this AM.    Current Facility-Administered Medications   Medication Frequency    acetaminophen tablet 1,000 mg Q8H PRN    albuterol-ipratropium 2.5 mg-0.5 mg/3 mL nebulizer solution 3 mL Q6H PRN    aluminum-magnesium hydroxide-simethicone 200-200-20 mg/5 mL suspension 30 mL QID PRN    dextrose 10% bolus 125 mL PRN    dextrose 10% bolus 250 mL PRN    glucagon (human recombinant) injection 1 mg PRN    glucose chewable tablet 16 g PRN    glucose chewable tablet 24 g PRN    heparin (porcine) injection 5,000 Units Q8H    hydrALAZINE tablet 25 mg Q8H PRN    insulin aspart U-100 pen 0-5 Units QID (AC + HS) PRN    melatonin tablet 6 mg Nightly PRN    methylPREDNISolone sodium succinate (SOLU-MEDROL) 1,000 mg in dextrose 5 % 100 mL IVPB Daily    naloxone 0.4 mg/mL injection 0.02 mg PRN    ondansetron injection 4 mg Q8H PRN    oxyCODONE immediate release tablet 5 mg Q6H PRN    pantoprazole EC tablet 40 mg Daily    sodium chloride 0.9% flush 10 mL Q12H PRN     Objective:     Vital Signs (Most Recent):  Temp: 98 °F (36.7 °C) (03/14/22 1222)  Pulse: 82 (03/14/22 1222)  Resp: 18 (03/14/22 1222)  BP: (!) 168/81 (03/14/22 1222)  SpO2: 95 % (03/14/22 1222)  O2 Device (Oxygen Therapy): room air (03/14/22 0711)   Vital Signs (24h Range):  Temp:  [96.1 °F (35.6 °C)-98 °F (36.7 °C)] 98 °F (36.7 °C)  Pulse:  [] 82  Resp:  [14-18] 18  SpO2:  [91 %-99 %] 95 %  BP: (133-173)/(66-91) 168/81     Weight: 95.3 kg (210 lb) (03/13/22 0113)  Body mass index is 36.05 kg/m².  Body surface area is 2.07 meters squared.      Intake/Output Summary (Last 24 hours) at 3/14/2022 1417  Last data filed at 3/13/2022 1719  Gross per 24 hour   Intake 600 ml   Output --   Net 600 ml       Physical Exam   Constitutional: She is oriented to person, place, and time. She  appears obese. No distress.   HENT:   Head: Normocephalic and atraumatic.   Eyes: Pupils are equal, round, and reactive to light. Right eye exhibits no discharge. Left eye exhibits no discharge. No scleral icterus.   Cardiovascular: Normal rate.   Pulmonary/Chest: Effort normal. No respiratory distress.   Abdominal: Soft. She exhibits no distension. There is no abdominal tenderness. There is no rebound and no guarding.   Musculoskeletal:      Cervical back: Normal range of motion and neck supple.   Neurological: She is alert and oriented to person, place, and time.   Skin: Skin is warm and dry. No rash noted. She is not diaphoretic. No erythema.   Psychiatric: Mood and affect normal.   Nursing note and vitals reviewed.    Significant Labs:  All pertinent lab results from the last 24 hours have been reviewed.    Significant Imaging:  Imaging results within the past 24 hours have been reviewed.

## 2022-03-14 NOTE — PROGRESS NOTES
[facility]  Rheumatology  Progress Note    Patient Name: Rufus Carpenter  MRN: [id number]  Admission Date: 3/12/2022  Hospital Length of Stay: 2 days  Code Status: Full Code   Attending Provider: Yenifer Pugh MD  Primary Care Physician: Naty Sanon MD  Principal Problem: GCA (giant cell arteritis)    Subjective:     HPI: 66 year old AAF with no significant PMH presents to the ED with a complaint of intermittent headaches that started about a month ago. She states her headaches were initially started on the left side. She describes the headache as throbbing and relieved with OTC meds. She states using tylenol and ibuprofen also alleviates her headaches. She states the HA on her right side started on Thursday. She states she does not have a history of getting headaches. She states she has had sinus issues in the past, but she has not any since having COVID-19, which was a while ago.     Denies photophobia, visual disturbance, ear pain, congestion, sore throat, dysuria, leg swelling, neck stiffness, dizziness, weakness, numbness, or ever having shingles     In the ED patient afebrile and hemodynamically stable saturating well on room air. ESR and CRP elevated. Ophthalmology consulted and evaluated patient and felt in setting of temporal headache and elevated inflammatory markers recommended initiating treatment for GCA. ED discussed with rheumatology as well who recommended admission for further evaluation and iv steroids. Rheumatology consulted for assistance.     Patients denies any Rheum ROS. No family history of any autoimmune conditions.           Interval History: Patient doing well today.  Denies any fever or chills.  No new visual symptoms.  Is headache free today.  Denies jaw pain or claudication.  Day 2/3 of Solumedrol 1000mg given this AM.    Current Facility-Administered Medications   Medication Frequency    acetaminophen tablet 1,000 mg Q8H PRN    albuterol-ipratropium 2.5 mg-0.5 mg/3  mL nebulizer solution 3 mL Q6H PRN    aluminum-magnesium hydroxide-simethicone 200-200-20 mg/5 mL suspension 30 mL QID PRN    dextrose 10% bolus 125 mL PRN    dextrose 10% bolus 250 mL PRN    glucagon (human recombinant) injection 1 mg PRN    glucose chewable tablet 16 g PRN    glucose chewable tablet 24 g PRN    heparin (porcine) injection 5,000 Units Q8H    hydrALAZINE tablet 25 mg Q8H PRN    insulin aspart U-100 pen 0-5 Units QID (AC + HS) PRN    melatonin tablet 6 mg Nightly PRN    methylPREDNISolone sodium succinate (SOLU-MEDROL) 1,000 mg in dextrose 5 % 100 mL IVPB Daily    naloxone 0.4 mg/mL injection 0.02 mg PRN    ondansetron injection 4 mg Q8H PRN    oxyCODONE immediate release tablet 5 mg Q6H PRN    pantoprazole EC tablet 40 mg Daily    sodium chloride 0.9% flush 10 mL Q12H PRN     Objective:     Vital Signs (Most Recent):  Temp: 98 °F (36.7 °C) (03/14/22 1222)  Pulse: 82 (03/14/22 1222)  Resp: 18 (03/14/22 1222)  BP: (!) 168/81 (03/14/22 1222)  SpO2: 95 % (03/14/22 1222)  O2 Device (Oxygen Therapy): room air (03/14/22 0711)   Vital Signs (24h Range):  Temp:  [96.1 °F (35.6 °C)-98 °F (36.7 °C)] 98 °F (36.7 °C)  Pulse:  [] 82  Resp:  [14-18] 18  SpO2:  [91 %-99 %] 95 %  BP: (133-173)/(66-91) 168/81     Weight: 95.3 kg (210 lb) (03/13/22 0113)  Body mass index is 36.05 kg/m².  Body surface area is 2.07 meters squared.      Intake/Output Summary (Last 24 hours) at 3/14/2022 1417  Last data filed at 3/13/2022 1719  Gross per 24 hour   Intake 600 ml   Output --   Net 600 ml       Physical Exam   Constitutional: She is oriented to person, place, and time. She appears obese. No distress.   HENT:   Head: Normocephalic and atraumatic.   Eyes: Pupils are equal, round, and reactive to light. Right eye exhibits no discharge. Left eye exhibits no discharge. No scleral icterus.   Cardiovascular: Normal rate.   Pulmonary/Chest: Effort normal. No respiratory distress.   Abdominal: Soft. She exhibits  no distension. There is no abdominal tenderness. There is no rebound and no guarding.   Musculoskeletal:      Cervical back: Normal range of motion and neck supple.   Neurological: She is alert and oriented to person, place, and time.   Skin: Skin is warm and dry. No rash noted. She is not diaphoretic. No erythema.   Psychiatric: Mood and affect normal.   Nursing note and vitals reviewed.    Significant Labs:  All pertinent lab results from the last 24 hours have been reviewed.    Significant Imaging:  Imaging results within the past 24 hours have been reviewed.    Assessment/Plan:     * GCA (giant cell arteritis)  66 year old AAF admitted to [facility] for concern of GCA due to new onset headaches that started 1 month ago, with most recent being on her right side and a elevated ESR of 56. Ophtho exam was normal but they felt concerned due to her clinical history and recommended treatment. Patient denies PMR symptoms, denies jaw claudication, denies visual symptoms, and denies temporal tenderness.     SED 56  CRP 12    MRA:   No evidence for significant mural thickening of the visualized superficial temporal arteries bilaterally to suggest active arteritis.    Temporal Artery US:  WNL    Assessment/Plan:  Patients clinical history for HA and elevated ESR can go along with GCA however very low suspicion at this time now with negative temporal artery ultrasound and MRA. Eye exam normal.     -Is to follow-up with vascular for temporal artery biopsy  -Recommend finishing 3 day course of Solumedrol; However, would not recommend steroids outpatient  -Discussed and educated patient and family on GCA; Counseled on worrisome symptoms that would warrant return to ED such as jaw pain/claudication or new visual symptoms  -Will follow up with patient as outpatient after discharge within 1-2 weeks.     Case discussed with Dr. Mckeon, please await final attestation.       Will signoff at this time, but please contact with any further  questions.    Bijan Garner MD  Rheumatology  [facility]

## 2022-03-14 NOTE — ASSESSMENT & PLAN NOTE
New onset temporal HA over the last month and ESR and CRP elevated in setting of persistent temporal headaches raised suspicion for GCA. MRA GCA protocol without any abnormalities. Temporal artery US unremarkable  - Ophthamology consulted  ;  Appreciate recs  - Rheumatology consulted, appreciate recs  - Vascular surgery, plans outpt biopsy this week  - Solumedrol 1g daily for three doses  - protonix 40mg po daily while on steroids  - accuchecks ACHS and will start SSI while on high dose steroids  - pain control  -will dc on pred 60 mg until rheum follow up after her last dose of steroids

## 2022-03-14 NOTE — ASSESSMENT & PLAN NOTE
66 year old AAF admitted to [facility] for concern of GCA due to new onset headaches that started 1 month ago, with most recent being on her right side and a elevated ESR of 56. Ophtho exam was normal but they felt concerned due to her clinical history and recommended treatment. Patient denies PMR symptoms, denies jaw claudication, denies visual symptoms, and denies temporal tenderness.     SED 56  CRP 12    MRA:   No evidence for significant mural thickening of the visualized superficial temporal arteries bilaterally to suggest active arteritis.    Temporal Artery US:  WNL    Assessment/Plan:  Patients clinical history for HA and elevated ESR can go along with GCA however very low suspicion at this time now with negative temporal artery ultrasound and MRA. Eye exam normal.     -Is to follow-up with vascular for temporal artery biopsy  -Recommend finishing 3 day course of Solumedrol; However, would not recommend steroids outpatient  -Discussed and educated patient and family on GCA; Counseled on worrisome symptoms that would warrant return to ED such as jaw pain/claudication or new visual symptoms  -Will follow up with patient as outpatient after discharge within 1-2 weeks.     Case discussed with Dr. Mckeon, please await final attestation.

## 2022-03-14 NOTE — ASSESSMENT & PLAN NOTE
New diagnosis of DM a1c 6.5  Sugars in 200s.  -discussed with patient today  -glucometer sent to her pharmacy (where her dtr works)  -will start metformin on discharge  -follow up PCP  -already had dilated eye exam this admit  -referral to [facility] outpt for education  -discussed dietary modification with patient (she is not keen on stopping sugary drinks)  -continue blood glucose monitoring and correction scale  -if remains persistently >200, can do daily NPH with am steroids

## 2022-03-14 NOTE — PLAN OF CARE
A&Ox4. VVS on RA. Adequate UOP per toilet. Tolerating consistent carb diet well. Ambulating independently in room. No complaints of headache this shift. ACHS maintained. Pt currently resting comfortably, bed in low position, call light in reach. WCTM.

## 2022-03-14 NOTE — PLAN OF CARE
CM MET WITH THE PT TO DISCUSS DISCHARGE PLANNING SHE STATES THAT SHE LIVES IN A 1 STORY HOUSE WITH HER FAMILY SHE IS NOT ON DIALYSIS AND DOES NOT TAKE COUMADIN SHE HAS TRANSPORTATION HOME   [address] [facility]   PCP MOHAN FONSECA LAST APPT 03/22  LIZETTE CARPENTER   [facility]  Initial Discharge Assessment       Primary Care Provider: Mohan Fonseca MD    Admission Diagnosis: GCA (giant cell arteritis) [M31.6]  Chest pain [R07.9]    Admission Date: 3/12/2022  Expected Discharge Date: 3/15/2022    Discharge Barriers Identified: None    Payor: HU[employer] MANAGED MEDICARE / Plan: HU[employer] MEDICARE [facility] / Product Type: Medicare Advantage /     Extended Emergency Contact Information  Primary Emergency Contact: Maggy Carpenter  Address: [address] [place] [address]  Home Phone: [phone]  Mobile Phone: [phone]  Relation: Daughter    Discharge Plan A: Home with family, Home, Home Health  Discharge Plan B: Home Health, Home with family, Home      [facility] - [facility] [place] - [address] [facility]  [address] [facility] [address]  Phone: [phone] Fax: [phone]      Initial Assessment (most recent)     Adult Discharge Assessment - 03/14/22 0956        Discharge Assessment    Assessment Type Discharge Planning Assessment     Confirmed/corrected address, phone number and insurance Yes     Confirmed Demographics Correct on Facesheet     Source of Information patient     When was your last doctors appointment? 03/14/21     Communicated ANA with patient/caregiver Date not available/Unable to determine     Lives With spouse     Do you expect to return to your current living situation? Yes     Do you have help at home or someone to help you manage your care at home? No     Prior to hospitilization cognitive status: No Deficits     Current cognitive status: No Deficits     Walking or Climbing  Stairs Difficulty none     Dressing/Bathing Difficulty none     Home Layout Able to live on 1st floor     Equipment Currently Used at Home none     Readmission within 30 days? No     Patient currently being followed by outpatient case management? No     Do you currently have service(s) that help you manage your care at home? No     Do you take prescription medications? Yes     Do you have prescription coverage? Yes     Do you have any problems affording any of your prescribed medications? No     Is the patient taking medications as prescribed? yes     Who is going to help you get home at discharge? FABIANA SANDY      How do you get to doctors appointments? family or friend will provide     Are you on dialysis? No     Do you take coumadin? No     Discharge Plan A Home with family;Home;Home Health     Discharge Plan B Home Health;Home with family;Home     DME Needed Upon Discharge  none     Discharge Plan discussed with: Patient     Discharge Barriers Identified None

## 2022-03-14 NOTE — PLAN OF CARE
Plan of care reviewed with patient at bedside. Patient oriented x4, bed in lowest position, and call light in reach. No complaints of pain or headache overnight. BG of 256 last night, covered via sliding scale. No complaints overnight, will continue with plan of care.      Problem: Adult Inpatient Plan of Care  Goal: Plan of Care Review  Outcome: Ongoing, Progressing  Goal: Patient-Specific Goal (Individualized)  Outcome: Ongoing, Progressing  Goal: Absence of Hospital-Acquired Illness or Injury  Outcome: Ongoing, Progressing  Goal: Optimal Comfort and Wellbeing  Outcome: Ongoing, Progressing  Goal: Readiness for Transition of Care  Outcome: Ongoing, Progressing

## 2022-03-14 NOTE — ASSESSMENT & PLAN NOTE
A1C 6.8% so new diagnosis of DM with superimposed steroid induced hypoglycemia  adding metformin on discharge, especially given need for steroids  See above for plan

## 2022-03-14 NOTE — PROGRESS NOTES
[facility] Medicine  Progress Note    Patient Name: Rufus Carpenter  MRN: [id number]  Patient Class: IP- Inpatient   Admission Date: 3/12/2022  Length of Stay: 2 days  Attending Physician: Yenifer Pugh MD  Primary Care Provider: Naty Sanon MD        Subjective:     Principal Problem:GCA (giant cell arteritis)        HPI:  The patient is a 66 y.o. female with a past medical history of HTN and edema who presents to the ED with a complaint of intermittent R headaches that have been occurring for about a month. She states her headaches were initially located at her left temple. However, she states her headache has moved to her right temple. She describes the headache at her left temple as throbbing. She states applying pressure to her temples alleviates the pain her headaches cause. She states using tylenol and ibuprofen also alleviates her headaches. She states she had a headache located at her right temple earlier today. A family member claims she was nauseous during the episode. She states coughing and sneezing makes her feel the headache more. She states she took a tylenol earlier today after her headache occurred, and she currently does not have a headache. She states she does not have a history of getting headaches. She states she has had sinus issues in the past, but she has not any since having COVID-19, which was a while ago. Denies photophobia, visual disturbance, ear pain, congestion, sore throat, dysuria, leg swelling, neck stiffness, dizziness, weakness, numbness, or ever having shingles    In the ED patient afebrile and hemodynamically stable saturating well on room air. ESR and CRP elevated. Ophthalmology consulted and evaluated patient and felt in setting of temporal headache and elevated inflammatory markers recommended initiating treatment for GCA. ED discussed with rheumatology as well who recommended admission for further evaluation and iv steroids. Patient admitted to  the care of medicine for further evaluation and management.      Overview/Hospital Course:  No notes on file    Interval History: doing well today. No HA. Discussed A1C and finding of DM. Patient writes everything down to share with her 2 daughters (pediatric nurse and pharmacist). Discussed via phone with her pharmacist dtr as well. Otherwise she feels at her baseline    Review of Systems   All other systems reviewed and are negative.  Objective:     Vital Signs (Most Recent):  Temp: 98 °F (36.7 °C) (03/14/22 1222)  Pulse: 82 (03/14/22 1222)  Resp: 18 (03/14/22 1222)  BP: (!) 168/81 (03/14/22 1222)  SpO2: 95 % (03/14/22 1222)   Vital Signs (24h Range):  Temp:  [96.1 °F (35.6 °C)-98 °F (36.7 °C)] 98 °F (36.7 °C)  Pulse:  [] 82  Resp:  [14-18] 18  SpO2:  [91 %-99 %] 95 %  BP: (133-173)/(66-91) 168/81     Weight: 95.3 kg (210 lb)  Body mass index is 36.05 kg/m².    Intake/Output Summary (Last 24 hours) at 3/14/2022 1354  Last data filed at 3/13/2022 1719  Gross per 24 hour   Intake 600 ml   Output --   Net 600 ml      Physical Exam  Constitutional:       General: She is not in acute distress.     Appearance: She is not toxic-appearing or diaphoretic.   HENT:      Head: Normocephalic and atraumatic.      Nose: Nose normal.   Eyes:      General: No scleral icterus.     Extraocular Movements: Extraocular movements intact.      Pupils: Pupils are equal, round, and reactive to light.   Cardiovascular:      Rate and Rhythm: Normal rate and regular rhythm.   Pulmonary:      Effort: Pulmonary effort is normal. No respiratory distress.      Breath sounds: No wheezing or rales.   Abdominal:      General: Abdomen is flat. There is no distension.      Palpations: Abdomen is soft.      Tenderness: There is no abdominal tenderness. There is no guarding.   Musculoskeletal:         General: Normal range of motion.      Cervical back: Normal range of motion and neck supple. No rigidity.      Right lower leg: No edema.      Left  lower leg: No edema.   Skin:     General: Skin is warm and dry.      Coloration: Skin is not jaundiced.   Neurological:      General: No focal deficit present.      Mental Status: She is alert and oriented to person, place, and time.      Cranial Nerves: No cranial nerve deficit.   Psychiatric:         Mood and Affect: Mood normal.         Behavior: Behavior normal.       Significant Labs: All pertinent labs within the past 24 hours have been reviewed.    Significant Imaging: I have reviewed all pertinent imaging results/findings within the past 24 hours.      Assessment/Plan:      * GCA (giant cell arteritis)  New onset temporal HA over the last month and ESR and CRP elevated in setting of persistent temporal headaches raised suspicion for GCA. MRA GCA protocol without any abnormalities. Temporal artery US unremarkable  - Ophthamology consulted  ;  Appreciate recs  - Rheumatology consulted, appreciate recs  - Vascular surgery, plans outpt biopsy this week  - Solumedrol 1g daily for three doses  - protonix 40mg po daily while on steroids  - accuchecks ACHS and will start SSI while on high dose steroids  - pain control  -will dc on pred 60 mg until rheum follow up after her last dose of steroids      Steroid-induced hyperglycemia  New diagnosis of DM a1c 6.5  Sugars in 200s.  -discussed with patient today  -glucometer sent to her pharmacy (where her dtr works)  -will start metformin on discharge  -follow up PCP  -already had dilated eye exam this admit  -referral to RD outpt for education  -discussed dietary modification with patient (she is not keen on stopping sugary drinks)  -continue blood glucose monitoring and correction scale  -if remains persistently >200, can do daily NPH with am steroids      Type 2 diabetes mellitus without complication, without long-term current use of insulin  A1C 6.8% so new diagnosis of DM with superimposed steroid induced hypoglycemia  adding metformin on discharge, especially given  need for steroids  See above for plan      HTN (hypertension)  - controlled by diet  - hydralazine prn        VTE Risk Mitigation (From admission, onward)         Ordered     heparin (porcine) injection 5,000 Units  Every 8 hours         03/12/22 2152     IP VTE HIGH RISK PATIENT  Once         03/12/22 2152     Place sequential compression device  Until discontinued         03/12/22 2152                Discharge Planning   ANA: 3/15/2022     Code Status: Full Code   Is the patient medically ready for discharge?: No    Reason for patient still in hospital (select all that apply): Patient new problem and Patient trending condition  Discharge Plan A: Home with family, Home, Home Health   Discharge Delays: (!) Other              Yenifer Pugh MD  Department of Hospital Medicine   [facility]

## 2022-03-14 NOTE — SUBJECTIVE & OBJECTIVE
Interval History: doing well today. No HA. Discussed A1C and finding of DM. Patient writes everything down to share with her 2 daughters (pediatric nurse and pharmacist). Discussed via phone with her pharmacist dtr as well. Otherwise she feels at her baseline    Review of Systems   All other systems reviewed and are negative.  Objective:     Vital Signs (Most Recent):  Temp: 98 °F (36.7 °C) (03/14/22 1222)  Pulse: 82 (03/14/22 1222)  Resp: 18 (03/14/22 1222)  BP: (!) 168/81 (03/14/22 1222)  SpO2: 95 % (03/14/22 1222)   Vital Signs (24h Range):  Temp:  [96.1 °F (35.6 °C)-98 °F (36.7 °C)] 98 °F (36.7 °C)  Pulse:  [] 82  Resp:  [14-18] 18  SpO2:  [91 %-99 %] 95 %  BP: (133-173)/(66-91) 168/81     Weight: 95.3 kg (210 lb)  Body mass index is 36.05 kg/m².    Intake/Output Summary (Last 24 hours) at 3/14/2022 1354  Last data filed at 3/13/2022 1719  Gross per 24 hour   Intake 600 ml   Output --   Net 600 ml      Physical Exam  Constitutional:       General: She is not in acute distress.     Appearance: She is not toxic-appearing or diaphoretic.   HENT:      Head: Normocephalic and atraumatic.      Nose: Nose normal.   Eyes:      General: No scleral icterus.     Extraocular Movements: Extraocular movements intact.      Pupils: Pupils are equal, round, and reactive to light.   Cardiovascular:      Rate and Rhythm: Normal rate and regular rhythm.   Pulmonary:      Effort: Pulmonary effort is normal. No respiratory distress.      Breath sounds: No wheezing or rales.   Abdominal:      General: Abdomen is flat. There is no distension.      Palpations: Abdomen is soft.      Tenderness: There is no abdominal tenderness. There is no guarding.   Musculoskeletal:         General: Normal range of motion.      Cervical back: Normal range of motion and neck supple. No rigidity.      Right lower leg: No edema.      Left lower leg: No edema.   Skin:     General: Skin is warm and dry.      Coloration: Skin is not jaundiced.    Neurological:      General: No focal deficit present.      Mental Status: She is alert and oriented to person, place, and time.      Cranial Nerves: No cranial nerve deficit.   Psychiatric:         Mood and Affect: Mood normal.         Behavior: Behavior normal.       Significant Labs: All pertinent labs within the past 24 hours have been reviewed.    Significant Imaging: I have reviewed all pertinent imaging results/findings within the past 24 hours.

## 2022-03-15 ENCOUNTER — OFFICE VISIT (OUTPATIENT)
Dept: OPHTHALMOLOGY | Facility: CLINIC | Age: 67
End: 2022-03-15
Payer: MEDICARE

## 2022-03-15 VITALS
HEIGHT: 64 IN | HEART RATE: 73 BPM | SYSTOLIC BLOOD PRESSURE: 150 MMHG | TEMPERATURE: 98 F | DIASTOLIC BLOOD PRESSURE: 69 MMHG | OXYGEN SATURATION: 93 % | WEIGHT: 210 LBS | RESPIRATION RATE: 15 BRPM | BODY MASS INDEX: 35.85 KG/M2

## 2022-03-15 DIAGNOSIS — Z53.21 PATIENT LEFT BEFORE EVALUATION BY PHYSICIAN: Primary | ICD-10-CM

## 2022-03-15 LAB
ALBUMIN SERPL BCP-MCNC: 3.4 G/DL (ref 3.5–5.2)
ALP SERPL-CCNC: 99 U/L (ref 55–135)
ALT SERPL W/O P-5'-P-CCNC: 10 U/L (ref 10–44)
ANION GAP SERPL CALC-SCNC: 12 MMOL/L (ref 8–16)
AST SERPL-CCNC: 9 U/L (ref 10–40)
BILIRUB SERPL-MCNC: 0.2 MG/DL (ref 0.1–1)
BUN SERPL-MCNC: 20 MG/DL (ref 8–23)
CALCIUM SERPL-MCNC: 9.4 MG/DL (ref 8.7–10.5)
CHLORIDE SERPL-SCNC: 104 MMOL/L (ref 95–110)
CO2 SERPL-SCNC: 25 MMOL/L (ref 23–29)
CREAT SERPL-MCNC: 0.8 MG/DL (ref 0.5–1.4)
EST. GFR  (AFRICAN AMERICAN): >60 ML/MIN/1.73 M^2
EST. GFR  (NON AFRICAN AMERICAN): >60 ML/MIN/1.73 M^2
GLUCOSE SERPL-MCNC: 240 MG/DL (ref 70–110)
MAGNESIUM SERPL-MCNC: 2.1 MG/DL (ref 1.6–2.6)
PATHOLOGIST INTERPRETATION SPE: NORMAL
PHOSPHATE SERPL-MCNC: 2.5 MG/DL (ref 2.7–4.5)
POCT GLUCOSE: 193 MG/DL (ref 70–110)
POTASSIUM SERPL-SCNC: 3.9 MMOL/L (ref 3.5–5.1)
PROT SERPL-MCNC: 7.1 G/DL (ref 6–8.4)
SODIUM SERPL-SCNC: 141 MMOL/L (ref 136–145)

## 2022-03-15 PROCEDURE — 99499 UNLISTED E&M SERVICE: CPT | Mod: S$GLB,,, | Performed by: OPHTHALMOLOGY

## 2022-03-15 PROCEDURE — 25000003 PHARM REV CODE 250: Performed by: FAMILY MEDICINE

## 2022-03-15 PROCEDURE — 99499 NO LOS: ICD-10-PCS | Mod: S$GLB,,, | Performed by: OPHTHALMOLOGY

## 2022-03-15 PROCEDURE — 83735 ASSAY OF MAGNESIUM: CPT | Performed by: FAMILY MEDICINE

## 2022-03-15 PROCEDURE — 99239 HOSP IP/OBS DSCHRG MGMT >30: CPT | Mod: ,,, | Performed by: HOSPITALIST

## 2022-03-15 PROCEDURE — 3044F PR MOST RECENT HEMOGLOBIN A1C LEVEL <7.0%: ICD-10-PCS | Mod: CPTII,S$GLB,, | Performed by: OPHTHALMOLOGY

## 2022-03-15 PROCEDURE — 3044F HG A1C LEVEL LT 7.0%: CPT | Mod: CPTII,S$GLB,, | Performed by: OPHTHALMOLOGY

## 2022-03-15 PROCEDURE — 80053 COMPREHEN METABOLIC PANEL: CPT | Performed by: FAMILY MEDICINE

## 2022-03-15 PROCEDURE — 36415 COLL VENOUS BLD VENIPUNCTURE: CPT | Performed by: FAMILY MEDICINE

## 2022-03-15 PROCEDURE — 1126F PR PAIN SEVERITY QUANTIFIED, NO PAIN PRESENT: ICD-10-PCS | Mod: CPTII,S$GLB,, | Performed by: OPHTHALMOLOGY

## 2022-03-15 PROCEDURE — 63600175 PHARM REV CODE 636 W HCPCS: Performed by: FAMILY MEDICINE

## 2022-03-15 PROCEDURE — 1111F DSCHRG MED/CURRENT MED MERGE: CPT | Mod: CPTII,,, | Performed by: HOSPITALIST

## 2022-03-15 PROCEDURE — 1126F AMNT PAIN NOTED NONE PRSNT: CPT | Mod: CPTII,S$GLB,, | Performed by: OPHTHALMOLOGY

## 2022-03-15 PROCEDURE — 99239 PR HOSPITAL DISCHARGE DAY,>30 MIN: ICD-10-PCS | Mod: ,,, | Performed by: HOSPITALIST

## 2022-03-15 PROCEDURE — 84100 ASSAY OF PHOSPHORUS: CPT | Performed by: FAMILY MEDICINE

## 2022-03-15 PROCEDURE — 1111F PR DISCHARGE MEDS RECONCILED W/ CURRENT OUTPATIENT MED LIST: ICD-10-PCS | Mod: CPTII,,, | Performed by: HOSPITALIST

## 2022-03-15 RX ORDER — METFORMIN HYDROCHLORIDE 500 MG/1
TABLET ORAL
Qty: 727 TABLET | Refills: 0 | Status: SHIPPED | OUTPATIENT
Start: 2022-03-15 | End: 2023-03-22

## 2022-03-15 RX ADMIN — PANTOPRAZOLE SODIUM 40 MG: 40 TABLET, DELAYED RELEASE ORAL at 08:03

## 2022-03-15 RX ADMIN — METHYLPREDNISOLONE SODIUM SUCCINATE 1000 MG: 1 INJECTION, POWDER, LYOPHILIZED, FOR SOLUTION INTRAMUSCULAR; INTRAVENOUS at 05:03

## 2022-03-15 RX ADMIN — HEPARIN SODIUM 5000 UNITS: 5000 INJECTION INTRAVENOUS; SUBCUTANEOUS at 05:03

## 2022-03-15 NOTE — DISCHARGE INSTRUCTIONS
Dear Ms. Rufus Carpenter   You were hospitalized for headache concerning for giant cell arteritis. We treated you with steroids and you will have follow up with the vascular surgeons, rheumatologist and eye doctor.  The rheumatologist does not think you need steroids as an outpatient until follow up. If you symptoms change and you start have any worsening headaches, vision changes, cramping in your jaw with talk or eating, please come back to the emergency room right away.    We also checked you for diabetes because your sugars were high while on the steroids. You do have diabetes though it is relatively well controlled. We are working on a new primary care doctor for you so that you can have follow up for your diabetes. I have prescribed 1 new medication for diabetes called METFORMIN.  Please take 1 pill daily for 1 week then move to 1 pill twice a day.  I have already sent the blood sugar meter to the pharmacy.    It is a pleasure to participate in your care.    Sincerely,      Yenifer Pugh MD

## 2022-03-15 NOTE — ASSESSMENT & PLAN NOTE
A1C 6.8% so new diagnosis of DM with superimposed steroid induced hypoglycemia  See above for plan

## 2022-03-15 NOTE — PLAN OF CARE
PLAN IS TO DISCHARGE PT HOMED WITH NO POST ACUTE NEEDAS ALL FOLLOW UP APPTS ON THERE AVS  [facility]  Discharge Final Note    Primary Care Provider: Naty Sanon MD    Expected Discharge Date: 3/15/2022    Final Discharge Note (most recent)     Final Note - 03/15/22 1028        Final Note    Assessment Type Final Discharge Note     Anticipated Discharge Disposition Home or Self Care     Hospital Resources/Appts/Education Provided Appointments scheduled and added to AVS        Post-Acute Status    Discharge Delays None known at this time                 Important Message from Medicare  Important Message from Medicare regarding Discharge Appeal Rights: Given to patient/caregiver, Explained to patient/caregiver, Signed/date by patient/caregiver     Date IMM was signed: 03/15/22  Time IMM was signed: 0838    Contact Info     [facility]   Specialty: Emergency Medicine    [address]  [facility] [address]   Phone: [phone]       Next Steps: Follow up    [facility]  [address]   [facility] [address] [phone]       Next Steps: Follow up    Instructions: This is your new PCP appointment   F/U appointment 1:00 pm

## 2022-03-15 NOTE — HOSPITAL COURSE
Admitted for concern of GCA given new onset headaches and elevated inflammatory markers. She was seen by ophthalmology, rheumatology and vascular surgery. Rheumatology recommended 3 days of IV solumedrol and follow up with vascular surgery for temporal artery biopsy. They recommended no steroids on discharge. She was also found to have a new diagnosis of diabetes with A1C 6.8. She was counseled on diet, checking blood glucose and need for close PCP follow up. She was started on metformin and set up with new PCP. Referrals placed for specialty follow up.

## 2022-03-15 NOTE — ASSESSMENT & PLAN NOTE
New diagnosis of DM a1c 6.8  Sugars in 200s on steroids so a1c was check and return diagnostic of DM at 6.8  Discussed with patient dietary and changes and medication. She was amenable to starting metformin, though in general she does not like taking a lot of medication. Outpatient RD consult placed. Glucometer sent to her pharmacy  She already had dilated eye exam this admit. She was set up with new PCP for close outpatient follow up

## 2022-03-15 NOTE — PROGRESS NOTES
HPI     Triage pt  Patient here for ED follow up GCA.  Pt states OU vision stable since discharge from the hospital.  No eye pain or headaches.    Last edited by Lizz Krishnan MA on 3/15/2022  3:17 PM. (History)            Assessment /Plan     For exam results, see Encounter Report.    There are no diagnoses linked to this encounter.

## 2022-03-15 NOTE — PLAN OF CARE
A&Ox4. VVS on RA. Adequate UOP . Tolerating consistent carb diet well. Ambulating independently in room. No complaints of headache this shift. ACHS maintained. Pt currently resting comfortably, bed in low position, call light in reach. WCTM.   Meds delivered at bedside, pt left the floor on foot, refused transport. Dcd the IV

## 2022-03-15 NOTE — DISCHARGE SUMMARY
[facility] Medicine  Discharge Summary      Patient Name: Rufus Carpenter  MRN: [id number]  Patient Class: IP- Inpatient  Admission Date: 3/12/2022  Hospital Length of Stay: 3 days  Discharge Date and Time:  03/15/2022 10:25 AM  Attending Physician: Yenifer Pugh MD   Discharging Provider: Yenifer Pugh MD  Primary Care Provider: Naty Sanon MD  [facility] Medicine Team: [facility] Yenifer Pugh MD    HPI:   The patient is a 66 y.o. female with a past medical history of HTN and edema who presents to the ED with a complaint of intermittent R headaches that have been occurring for about a month. She states her headaches were initially located at her left temple. However, she states her headache has moved to her right temple. She describes the headache at her left temple as throbbing. She states applying pressure to her temples alleviates the pain her headaches cause. She states using tylenol and ibuprofen also alleviates her headaches. She states she had a headache located at her right temple earlier today. A family member claims she was nauseous during the episode. She states coughing and sneezing makes her feel the headache more. She states she took a tylenol earlier today after her headache occurred, and she currently does not have a headache. She states she does not have a history of getting headaches. She states she has had sinus issues in the past, but she has not any since having COVID-19, which was a while ago. Denies photophobia, visual disturbance, ear pain, congestion, sore throat, dysuria, leg swelling, neck stiffness, dizziness, weakness, numbness, or ever having shingles    In the ED patient afebrile and hemodynamically stable saturating well on room air. ESR and CRP elevated. Ophthalmology consulted and evaluated patient and felt in setting of temporal headache and elevated inflammatory markers recommended initiating treatment for GCA. ED discussed with rheumatology  as well who recommended admission for further evaluation and iv steroids. Patient admitted to the care of medicine for further evaluation and management.      * No surgery found *      Hospital Course:   Admitted for concern of GCA given new onset headaches and elevated inflammatory markers. She was seen by ophthalmology, rheumatology and vascular surgery. Rheumatology recommended 3 days of IV solumedrol and follow up with vascular surgery for temporal artery biopsy. They recommended no steroids on discharge. She was also found to have a new diagnosis of diabetes with A1C 6.8. She was counseled on diet, checking blood glucose and need for close PCP follow up. She was started on metformin and set up with new PCP. Referrals placed for specialty follow up.       Goals of Care Treatment Preferences:  Code Status: Full Code      Consults:   Consults (From admission, onward)        Status Ordering Provider     Inpatient consult to Rheumatology  Once        Provider:  (Not yet assigned)    Completed SIM RODRIGUEZ     Inpatient consult to Vascular Surgery  Once        Provider:  (Not yet assigned)    Completed SIM RODRIGUEZ     Inpatient consult to Ophthalmology  Once        Provider:  (Not yet assigned)    Completed AXEL EVANS          * GCA (giant cell arteritis)  New onset temporal HA over the last month and ESR and CRP elevated in setting of persistent temporal headaches raised suspicion for GCA. MRA GCA protocol without any abnormalities. Temporal artery US unremarkable. Ophthamology consulted and did dilated exam. Rheumatology consulted and recommended 3 days of high dose IV solumedrol which she completed. They did not recommend steroids on discharge but did want follow up. Vascular surgery consulted and plans outpt biopsy this week. All ambulatory referrals placed. Patient counseled on when to return to ED.      Steroid-induced hyperglycemia  New diagnosis of DM a1c 6.8  Sugars in 200s on  steroids so a1c was check and return diagnostic of DM at 6.8  Discussed with patient dietary and changes and medication. She was amenable to starting metformin, though in general she does not like taking a lot of medication. Outpatient RD consult placed. Glucometer sent to her pharmacy  She already had dilated eye exam this admit. She was set up with new PCP for close outpatient follow up        Type 2 diabetes mellitus without complication, without long-term current use of insulin  A1C 6.8% so new diagnosis of DM with superimposed steroid induced hypoglycemia  See above for plan      HTN (hypertension)  - controlled by diet and did not require inpatient treatment but does require outpatient follow up.        Final Active Diagnoses:    Diagnosis Date Noted POA    PRINCIPAL PROBLEM:  GCA (giant cell arteritis) [M31.6] 03/13/2022 Yes    Steroid-induced hyperglycemia [R73.9, T38.0X5A] 03/13/2022 No    Type 2 diabetes mellitus without complication, without long-term current use of insulin [E11.9] 03/14/2022 Yes    HTN (hypertension) [I10]  Yes      Problems Resolved During this Admission:     Discharge exam:  Vitals:    03/15/22 0807   BP: (!) 150/69   Pulse: 73   Resp: 15   Temp: 98 °F (36.7 °C)     Overall feels well. Ac/nd.   In good spirits  No TA tenderness  CTAB  RRR  NABS, soft  Ext w/o edema    Discharged Condition: good    Disposition: Home or Self Care    Follow Up:   Follow-up Information     [facility] - Emergency Dept Follow up.    Specialty: Emergency Medicine  Contact information:  [address]  [facility] [address] [phone]           Mahesh Stuart MD Follow up.    Why: This is your new PCP appointment   F/U appointment 1:00 pm  Contact information:  [facility]  [address]   [facility] [address] [phone]                     Patient Instructions:      Ambulatory referral/consult to Vascular Surgery   Standing Status: Future   Referral  Priority: Urgent Referral Type: Consultation   Referral Reason: Specialty Services Required   Requested Specialty: Vascular Surgery   Number of Visits Requested: 1     Ambulatory referral/consult to Rheumatology   Standing Status: Future   Referral Priority: Urgent Referral Type: Consultation   Referral Reason: Specialty Services Required   Requested Specialty: Rheumatology   Number of Visits Requested: 1     Ambulatory referral/consult to Ophthalmology   Standing Status: Future   Referral Priority: Urgent Referral Type: Consultation   Referral Reason: Specialty Services Required   Requested Specialty: Ophthalmology   Number of Visits Requested: 1     Ambulatory referral/consult to Nutrition Services   Standing Status: Future   Referral Priority: Routine Referral Type: Consultation   Referral Reason: Specialty Services Required   Requested Specialty: Nutrition   Number of Visits Requested: 1     Diet Adult Regular     No dressing needed     Notify your health care provider if you experience any of the following:  temperature >100.4     Notify your health care provider if you experience any of the following:  persistent nausea and vomiting or diarrhea     Notify your health care provider if you experience any of the following:  severe uncontrolled pain     Notify your health care provider if you experience any of the following:  difficulty breathing or increased cough     Notify your health care provider if you experience any of the following:  severe persistent headache     Notify your health care provider if you experience any of the following:  persistent dizziness, light-headedness, or visual disturbances     Notify your health care provider if you experience any of the following:  increased confusion or weakness     Activity as tolerated       Significant Diagnostic Studies: Labs: All labs within the past 24 hours have been reviewed    Pending Diagnostic Studies:     Procedure Component Value Units Date/Time     Aldolase [[id number]] Collected: 03/13/22 1345    Order Status: Sent Lab Status: In process Updated: 03/13/22 1352    Specimen: Blood          Medications:  Reconciled Home Medications:      Medication List      START taking these medications    blood sugar diagnostic Strp  Use 1 strip with meter to check glucose levels 2 (two) times a day.     blood-glucose meter Misc  Use as directed to check glucose levels 2 times daily     lancets 33 gauge Misc  Use 1 lancet by Misc.(Non-Drug; Combo Route) route 2 (two) times a day.     metFORMIN 500 MG tablet  Commonly known as: GLUCOPHAGE  Take 1 tablet (500 mg total) by mouth daily with breakfast for 7 days, THEN 1 tablet (500 mg total) 2 (two) times daily with meals.  Start taking on: March 15, 2022            Indwelling Lines/Drains at time of discharge:   Lines/Drains/Airways     None                 Time spent on the discharge of patient: 35 minutes         Yenifer Pugh MD  Department of Hospital Medicine  [facility]

## 2022-03-15 NOTE — ASSESSMENT & PLAN NOTE
- controlled by diet and did not require inpatient treatment but does require outpatient follow up.

## 2022-03-15 NOTE — ASSESSMENT & PLAN NOTE
New onset temporal HA over the last month and ESR and CRP elevated in setting of persistent temporal headaches raised suspicion for GCA. MRA GCA protocol without any abnormalities. Temporal artery US unremarkable. Ophthamology consulted and did dilated exam. Rheumatology consulted and recommended 3 days of high dose IV solumedrol which she completed. They did not recommend steroids on discharge but did want follow up. Vascular surgery consulted and plans outpt biopsy this week. All ambulatory referrals placed. Patient counseled on when to return to ED.     Lab Facility: [id number] Lab Facility: [id number]

## 2022-03-17 ENCOUNTER — PATIENT OUTREACH (OUTPATIENT)
Dept: ADMINISTRATIVE | Facility: CLINIC | Age: 67
End: 2022-03-17
Payer: MEDICARE

## 2022-03-17 NOTE — PROGRESS NOTES
C3 nurse attempted to contact Rufus Carpenter for a TCC post hospital discharge follow up call. No answer. Left voicemail with callback information. The patient has a scheduled HOSFU appointment with Mahesh Stuart MD on 3/22/22 @ 1pm.

## 2022-03-17 NOTE — PROGRESS NOTES
VASCULAR SURGERY NOTE    Patient ID: Rufus Carpenter is a 66 y.o. female.    I. HISTORY     Chief Complaint: Temporal artery biopsy     HPI: Rufus Carpenter is a 66 y.o. female who is here today for new patient initial appointment for temporal artery biopsy. She denies any headaches, changes in vision, chest pain, and sob since hospitalization. She has been off of all medication since hospitalization and remains asymptomatic.      Past Medical History:   Diagnosis Date    Edema     HTN (hypertension)         No past surgical history on file.    Social History     Occupational History    Not on file   Tobacco Use    Smoking status: Never Smoker    Smokeless tobacco: Never Used   Substance and Sexual Activity    Alcohol use: No    Drug use: No    Sexual activity: Not on file       Review of Systems   All other systems reviewed and are negative.        II. PHYSICAL EXAM     Physical Exam  Constitutional:       General: She is not in acute distress.     Appearance: Normal appearance. She is normal weight. She is not ill-appearing or diaphoretic.   HENT:      Head: Normocephalic and atraumatic.      Comments: No temporal artery tenderness to palpation, wig in place     Nose: No congestion.   Eyes:      General: No scleral icterus.        Right eye: No discharge.         Left eye: No discharge.      Extraocular Movements: Extraocular movements intact.      Conjunctiva/sclera: Conjunctivae normal.   Cardiovascular:      Rate and Rhythm: Normal rate and regular rhythm.      Comments: Bilateral temporal aa. Pulses 2+  Pulmonary:      Effort: Pulmonary effort is normal. No respiratory distress.   Musculoskeletal:         General: No swelling. Normal range of motion.      Cervical back: Normal range of motion and neck supple.      Right lower leg: No edema.      Left lower leg: No edema.   Skin:     General: Skin is warm and dry.      Coloration: Skin is not jaundiced or pale.      Findings: No erythema or rash.    Neurological:      Mental Status: She is alert.         III. ASSESSMENT & PLAN (MEDICAL DECISION MAKING)     1. GCA (giant cell arteritis)        Imaging Results:  Temporal Artery Ultrasound: negative    MRI Temporal artery protocol: negative    Assessment/Diagnosis and Plan:  66 y.o. female referred for temporal artery biopsy. I explained indications for temporal artery biopsy.  Explained that the biopsy would help secure the diagnosis of temporal arteritis which would help guide her treatment.  She explained to me that she is not currently on any treatment because she was told that it is unlikely that she has temporal arteritis.  I explained that even so, a temporal artery biopsy would help confirm that she does not have the conditions that it does not return.  She would not like to proceed with any biopsy at this time because she does not feel that she has the condition and states that she's doing fine without any treatment.      -RTC PRN if she would like to proceed with temporal artery biopsy.      ARELI Rea II, MD, [facility]  Vascular Surgery  [facility]

## 2022-03-18 NOTE — PROGRESS NOTES
Osmani forward this important TCC information to your provider in order to maximize the post discharge care delivery of this patient.    C3 nurse spoke with Rufus Carpenter  for a TCC post hospital discharge follow up call. The patient has a scheduled HOSFU appointment with MISSAEL Stuart on 03/22/2022 @ [id number]

## 2022-03-19 LAB — ALDOLASE SERPL-CCNC: 6.1 U/L (ref 1.2–7.6)

## 2022-03-21 ENCOUNTER — INITIAL CONSULT (OUTPATIENT)
Dept: VASCULAR SURGERY | Facility: CLINIC | Age: 67
End: 2022-03-21
Attending: SURGERY
Payer: MEDICARE

## 2022-03-21 VITALS
BODY MASS INDEX: 37.83 KG/M2 | DIASTOLIC BLOOD PRESSURE: 68 MMHG | SYSTOLIC BLOOD PRESSURE: 135 MMHG | HEIGHT: 64 IN | WEIGHT: 221.56 LBS | HEART RATE: 77 BPM

## 2022-03-21 DIAGNOSIS — M31.6 GCA (GIANT CELL ARTERITIS): Primary | ICD-10-CM

## 2022-03-21 PROCEDURE — 99212 PR OFFICE/OUTPT VISIT, EST, LEVL II, 10-19 MIN: ICD-10-PCS | Mod: S$GLB,,, | Performed by: SURGERY

## 2022-03-21 PROCEDURE — 99212 OFFICE O/P EST SF 10 MIN: CPT | Mod: S$GLB,,, | Performed by: SURGERY

## 2022-03-21 PROCEDURE — 1111F DSCHRG MED/CURRENT MED MERGE: CPT | Mod: CPTII,S$GLB,, | Performed by: SURGERY

## 2022-03-21 PROCEDURE — 1111F PR DISCHARGE MEDS RECONCILED W/ CURRENT OUTPATIENT MED LIST: ICD-10-PCS | Mod: CPTII,S$GLB,, | Performed by: SURGERY

## 2022-03-21 PROCEDURE — 99999 PR PBB SHADOW E&M-EST. PATIENT-LVL III: ICD-10-PCS | Mod: PBBFAC,,, | Performed by: SURGERY

## 2022-03-21 PROCEDURE — 99999 PR PBB SHADOW E&M-EST. PATIENT-LVL III: CPT | Mod: PBBFAC,,, | Performed by: SURGERY

## 2022-03-22 ENCOUNTER — OFFICE VISIT (OUTPATIENT)
Dept: INTERNAL MEDICINE | Facility: CLINIC | Age: 67
End: 2022-03-22
Payer: MEDICARE

## 2022-03-22 ENCOUNTER — TELEPHONE (OUTPATIENT)
Dept: RHEUMATOLOGY | Facility: CLINIC | Age: 67
End: 2022-03-22
Payer: MEDICARE

## 2022-03-22 ENCOUNTER — LAB VISIT (OUTPATIENT)
Dept: LAB | Facility: HOSPITAL | Age: 67
End: 2022-03-22
Payer: MEDICARE

## 2022-03-22 VITALS
SYSTOLIC BLOOD PRESSURE: 148 MMHG | BODY MASS INDEX: 38.14 KG/M2 | DIASTOLIC BLOOD PRESSURE: 70 MMHG | HEART RATE: 75 BPM | WEIGHT: 222.25 LBS | OXYGEN SATURATION: 94 %

## 2022-03-22 DIAGNOSIS — M31.6 GCA (GIANT CELL ARTERITIS): ICD-10-CM

## 2022-03-22 DIAGNOSIS — E11.9 TYPE 2 DIABETES MELLITUS WITHOUT COMPLICATION, WITHOUT LONG-TERM CURRENT USE OF INSULIN: ICD-10-CM

## 2022-03-22 DIAGNOSIS — Z76.89 ENCOUNTER TO ESTABLISH CARE: Primary | ICD-10-CM

## 2022-03-22 LAB
CRP SERPL-MCNC: 21.9 MG/L (ref 0–8.2)
ERYTHROCYTE [SEDIMENTATION RATE] IN BLOOD BY WESTERGREN METHOD: 53 MM/HR (ref 0–36)

## 2022-03-22 PROCEDURE — 99203 OFFICE O/P NEW LOW 30 MIN: CPT | Mod: GC,S$GLB,, | Performed by: STUDENT IN AN ORGANIZED HEALTH CARE EDUCATION/TRAINING PROGRAM

## 2022-03-22 PROCEDURE — 99203 PR OFFICE/OUTPT VISIT, NEW, LEVL III, 30-44 MIN: ICD-10-PCS | Mod: GC,S$GLB,, | Performed by: STUDENT IN AN ORGANIZED HEALTH CARE EDUCATION/TRAINING PROGRAM

## 2022-03-22 PROCEDURE — 36415 COLL VENOUS BLD VENIPUNCTURE: CPT | Performed by: STUDENT IN AN ORGANIZED HEALTH CARE EDUCATION/TRAINING PROGRAM

## 2022-03-22 PROCEDURE — 99999 PR PBB SHADOW E&M-EST. PATIENT-LVL III: ICD-10-PCS | Mod: PBBFAC,GC,, | Performed by: STUDENT IN AN ORGANIZED HEALTH CARE EDUCATION/TRAINING PROGRAM

## 2022-03-22 PROCEDURE — 99999 PR PBB SHADOW E&M-EST. PATIENT-LVL III: CPT | Mod: PBBFAC,GC,, | Performed by: STUDENT IN AN ORGANIZED HEALTH CARE EDUCATION/TRAINING PROGRAM

## 2022-03-22 PROCEDURE — 1111F DSCHRG MED/CURRENT MED MERGE: CPT | Mod: CPTII,GC,S$GLB, | Performed by: STUDENT IN AN ORGANIZED HEALTH CARE EDUCATION/TRAINING PROGRAM

## 2022-03-22 PROCEDURE — 86140 C-REACTIVE PROTEIN: CPT | Performed by: STUDENT IN AN ORGANIZED HEALTH CARE EDUCATION/TRAINING PROGRAM

## 2022-03-22 PROCEDURE — 1111F PR DISCHARGE MEDS RECONCILED W/ CURRENT OUTPATIENT MED LIST: ICD-10-PCS | Mod: CPTII,GC,S$GLB, | Performed by: STUDENT IN AN ORGANIZED HEALTH CARE EDUCATION/TRAINING PROGRAM

## 2022-03-22 PROCEDURE — 85652 RBC SED RATE AUTOMATED: CPT | Performed by: STUDENT IN AN ORGANIZED HEALTH CARE EDUCATION/TRAINING PROGRAM

## 2022-03-22 NOTE — PATIENT INSTRUCTIONS
You were seen today for hospital follow up for headaches (and Giant cell arteritis) as well as to establish care with a new doctor     After today's visit:  Continue low salt diet for blood pressure management. Keep log of your home blood pressure readings and bring back to clinic in 3 months.   Continue to avoid foods that raise blood sugar and exercise. Keep log of your blood glucose and bring back in 3 months   Take your metformin as prescribed  Keep your appt with Rheumatology   Get lab work done     Low-Salt Choices  Eating salt (sodium) can make your body retain too much water. Excess water makes your heart work harder. Canned, packaged, and frozen foods are easy to prepare, but they are often high in sodium. Here are some ideas for low-salt foods you can easily prepare yourself.    For Breakfast  Fruit or fruit juice  Bread or an English muffin  Shredded wheat  [place] tortillas  Steamed rice, unsalted  Hot cereal, regular (not instant) made without salt  Stay away from:  Sausage, cabrera, ham  Flour tortillas  Packaged muffins, pancakes, and biscuits  For Lunch and Dinner  Fresh fish, chicken, turkey, or meat-- baked, broiled, or roasted without salt  Dry beans, cooked without salt  Tofu, stir-fried without salt  Stay away from:  Lunch meat  Cheese  Tomato juice and catsup  Canned vegetables, soups, fish  Packaged gravies and sauces  Olives, pickles, relish  Bottled salad dressings  For Snacks and Desserts  Yogurt  Popcorn, air popped, unsalted  Stay away from:  Pies  Canned and packaged puddings  Pretzels, chips, crackers, and nuts--unless the label says unsalted  © 3954-3076 [employer], [address] [place] [address], [place] [address]. All rights reserved. This information is not intended as a substitute for professional medical care. Always follow your healthcare professional's instructions.

## 2022-03-22 NOTE — PROGRESS NOTES
Clinic Note  3/22/2022      Subjective:       Patient ID:  Christina is a 66 y.o. female being seen for an established visit.    Chief Complaint: No chief complaint on file.    Ms. Carpenter is a 66 F with PMHx of T2DM, recent dx of GCA who presents to clinic for 1 week follow up and to establish care. Presented to [facility] ED on 3/12 with right sided HA over 1 month and found to have elevated inflammatory markers concerning for GCA. Started on 3 days IV solumedrol per Rheum with plan to follow up s/p discharge. Was also found to have HbA1c 6.8 during hospitalization; discharged on metformin 500 BID. Patient has not had headaches, dizziness, changes in vision, CP since discharge. Has previously been managed with daughters of jenna, but has not seen them in over a year.     T2DM  -meds: metformin 500 BID   -BMI 38 (with sedentary lifestyle)  -blood sugars at home 110s-150s mg/dL  -recently diagnosed (3/14/22)      Review of Systems   Constitutional: Negative for chills, diaphoresis, fever, malaise/fatigue and weight loss.   HENT: Negative for sore throat.    Eyes: Negative for blurred vision.   Respiratory: Negative for cough and shortness of breath.    Cardiovascular: Negative for chest pain, palpitations and leg swelling.   Gastrointestinal: Negative for abdominal pain, constipation, diarrhea, heartburn, nausea and vomiting.   Genitourinary: Negative for dysuria.   Musculoskeletal: Negative for joint pain and myalgias.   Neurological: Negative for tingling, weakness and headaches.   Endo/Heme/Allergies: Negative for environmental allergies.   Psychiatric/Behavioral: Negative for depression. The patient does not have insomnia.        Past Medical History:   Diagnosis Date    Edema     HTN (hypertension)        Family History   Problem Relation Age of Onset    Diabetes Mother     Hypertension Mother     Cancer Mother     Coronary artery disease Mother     Stroke Father         reports that she has never smoked. She  "has never used smokeless tobacco. She reports that she does not drink alcohol and does not use drugs.    Medication List with Changes/Refills   Current Medications    BLOOD SUGAR DIAGNOSTIC STRP    Use 1 strip with meter to check glucose levels 2 (two) times a day.    BLOOD-GLUCOSE METER MISC    Use as directed to check glucose levels 2 times daily    LANCETS 33 GAUGE MISC    Use 1 lancet by Misc.(Non-Drug; Combo Route) route 2 (two) times a day.    METFORMIN (GLUCOPHAGE) 500 MG TABLET    Take 1 tablet (500 mg total) by mouth daily with breakfast for 7 days, THEN 1 tablet (500 mg total) 2 (two) times daily with meals.     Review of patient's allergies indicates:  No Known Allergies    Patient Active Problem List   Diagnosis    HTN (hypertension)    GCA (giant cell arteritis)    Steroid-induced hyperglycemia    Type 2 diabetes mellitus without complication, without long-term current use of insulin           Objective:      BP (!) 148/70 (BP Location: Left arm, Patient Position: Sitting, BP Method: Large (Manual))   Pulse 75   Wt 100.8 kg (222 lb 3.6 oz)   SpO2 (!) 94%   BMI 38.14 kg/m²   Estimated body mass index is 38.14 kg/m² as calculated from the following:    Height as of 3/21/22: 5' 4" (1.626 m).    Weight as of this encounter: 100.8 kg (222 lb 3.6 oz).  Physical Exam  Constitutional:       General: She is not in acute distress.     Appearance: She is obese. She is not diaphoretic.   HENT:      Head: Normocephalic and atraumatic.      Nose: Nose normal.      Mouth/Throat:      Mouth: Mucous membranes are moist.   Eyes:      General: No scleral icterus.     Extraocular Movements: Extraocular movements intact.      Conjunctiva/sclera: Conjunctivae normal.   Neck:      Thyroid: No thyromegaly.   Cardiovascular:      Rate and Rhythm: Normal rate and regular rhythm.      Pulses: Normal pulses.      Heart sounds: Normal heart sounds.   Pulmonary:      Effort: Pulmonary effort is normal.      Breath sounds: " Normal breath sounds. No wheezing or rales.   Abdominal:      General: Bowel sounds are normal. There is no distension.      Palpations: Abdomen is soft. There is no mass.      Tenderness: There is no abdominal tenderness.   Musculoskeletal:         General: Normal range of motion.      Cervical back: Normal range of motion.      Right lower leg: No edema.      Left lower leg: No edema.   Skin:     General: Skin is warm and dry.      Findings: No rash.   Neurological:      General: No focal deficit present.      Mental Status: She is alert and oriented to person, place, and time.   Psychiatric:         Mood and Affect: Mood and affect normal.         Behavior: Behavior normal.         Thought Content: Thought content normal.         Judgment: Judgment normal.           Assessment and Plan:   66 F with GCA, T2DM who presents to clinic to establish care. HTN on today's visit, will recheck BP on next visit to confirm diagnosis is HTN. Patient with elevated readings while inpatient but likely related to IV steroids. Will repeat inflammatory markers (ESR 56, CRP 12.9 on admission) and place referral to Rheum.        Diagnoses and all orders for this visit:    Encounter to establish care    GCA (giant cell arteritis)  -     Sedimentation rate; Future  -     C-reactive protein; Future  -     Ambulatory referral/consult to Rheumatology; Future    Type 2 diabetes mellitus without complication, without long-term current use of insulin          -Metformin 500 BID         -Keep BG log          -Recheck HbA1c in 3 mo        Follow up in about 3 months (around 6/22/2022).    Other Orders Placed This Visit:  Orders Placed This Encounter   Procedures    Sedimentation rate    C-reactive protein    Ambulatory referral/consult to Rheumatology         Mahesh Stuart MD  Internal Medicine, PGY-2    Discussed with Dr. Magdaleno     I have discussed A/P with Narciso and agree with plan of action.  Leandro Garza.

## 2022-03-22 NOTE — TELEPHONE ENCOUNTER
-no available  appt at this time. Will notify patient when July schedule opens.     Thank you,   Daniela     ----- Message from Eden Oconnell MA sent at 3/22/2022  2:54 PM CDT -----  Regarding: FW: Scheduling  Medicaid pt   ----- Message -----  From: Blossom Chamberlain  Sent: 3/22/2022   2:13 PM CDT  To: [facility] Staff  Subject: Scheduling                                       Pt is established with provider and would like to schedule. Please assist

## 2022-03-29 ENCOUNTER — OFFICE VISIT (OUTPATIENT)
Dept: OPHTHALMOLOGY | Facility: CLINIC | Age: 67
End: 2022-03-29
Payer: MEDICARE

## 2022-03-29 DIAGNOSIS — M31.6 GCA (GIANT CELL ARTERITIS): Primary | ICD-10-CM

## 2022-03-29 PROCEDURE — 99999 PR PBB SHADOW E&M-EST. PATIENT-LVL III: CPT | Mod: PBBFAC,,, | Performed by: OPHTHALMOLOGY

## 2022-03-29 PROCEDURE — 99999 PR PBB SHADOW E&M-EST. PATIENT-LVL III: ICD-10-PCS | Mod: PBBFAC,,, | Performed by: OPHTHALMOLOGY

## 2022-03-29 PROCEDURE — 99213 OFFICE O/P EST LOW 20 MIN: CPT | Mod: S$GLB,,, | Performed by: OPHTHALMOLOGY

## 2022-03-29 PROCEDURE — 1159F MED LIST DOCD IN RCRD: CPT | Mod: CPTII,S$GLB,, | Performed by: OPHTHALMOLOGY

## 2022-03-29 PROCEDURE — 1159F PR MEDICATION LIST DOCUMENTED IN MEDICAL RECORD: ICD-10-PCS | Mod: CPTII,S$GLB,, | Performed by: OPHTHALMOLOGY

## 2022-03-29 PROCEDURE — 3044F PR MOST RECENT HEMOGLOBIN A1C LEVEL <7.0%: ICD-10-PCS | Mod: CPTII,S$GLB,, | Performed by: OPHTHALMOLOGY

## 2022-03-29 PROCEDURE — 3044F HG A1C LEVEL LT 7.0%: CPT | Mod: CPTII,S$GLB,, | Performed by: OPHTHALMOLOGY

## 2022-03-29 PROCEDURE — 1126F AMNT PAIN NOTED NONE PRSNT: CPT | Mod: CPTII,S$GLB,, | Performed by: OPHTHALMOLOGY

## 2022-03-29 PROCEDURE — 1111F DSCHRG MED/CURRENT MED MERGE: CPT | Mod: CPTII,S$GLB,, | Performed by: OPHTHALMOLOGY

## 2022-03-29 PROCEDURE — 1160F PR REVIEW ALL MEDS BY PRESCRIBER/CLIN PHARMACIST DOCUMENTED: ICD-10-PCS | Mod: CPTII,S$GLB,, | Performed by: OPHTHALMOLOGY

## 2022-03-29 PROCEDURE — 1111F PR DISCHARGE MEDS RECONCILED W/ CURRENT OUTPATIENT MED LIST: ICD-10-PCS | Mod: CPTII,S$GLB,, | Performed by: OPHTHALMOLOGY

## 2022-03-29 PROCEDURE — 1126F PR PAIN SEVERITY QUANTIFIED, NO PAIN PRESENT: ICD-10-PCS | Mod: CPTII,S$GLB,, | Performed by: OPHTHALMOLOGY

## 2022-03-29 PROCEDURE — 1160F RVW MEDS BY RX/DR IN RCRD: CPT | Mod: CPTII,S$GLB,, | Performed by: OPHTHALMOLOGY

## 2022-03-29 PROCEDURE — 99213 PR OFFICE/OUTPT VISIT, EST, LEVL III, 20-29 MIN: ICD-10-PCS | Mod: S$GLB,,, | Performed by: OPHTHALMOLOGY

## 2022-03-29 NOTE — PROGRESS NOTES
HPI     Triage pt  Patient here for ED follow up GCA.  Vision stable. No eye pain.    I have personally interviewed the patient, reviewed the history and   examined the patient and agree with the technician's exam.     Currently on no medications for GCA.    Last edited by Samir Cantu MD on 3/29/2022 11:38 AM. (History)            Assessment /Plan     For exam results, see Encounter Report.    GCA (giant cell arteritis)      I found no ocular changes that would indicate active inflammation. Ms. Carpenter will return to me as needed.

## 2022-04-14 ENCOUNTER — TELEPHONE (OUTPATIENT)
Dept: INTERNAL MEDICINE | Facility: CLINIC | Age: 67
End: 2022-04-14
Payer: MEDICARE

## 2022-04-14 NOTE — TELEPHONE ENCOUNTER
Called to discuss lab results with patient.  Informed her of elevated ESR, CRP since admission for GCA on 3/12/22. Denies any headaches since d/c. Rheum appointment scheduled on July 15, 2022. Instructed to contact clinic and/or go to ED if headaches or vision changes occur.

## 2022-05-18 ENCOUNTER — TELEPHONE (OUTPATIENT)
Dept: INTERNAL MEDICINE | Facility: CLINIC | Age: 67
End: 2022-05-18
Payer: MEDICARE

## 2022-05-18 NOTE — TELEPHONE ENCOUNTER
LVM notifying pt of appt cancellation (rueda) and that she may contact the clinic to reschedule on a later date

## 2022-05-19 ENCOUNTER — TELEPHONE (OUTPATIENT)
Dept: INTERNAL MEDICINE | Facility: CLINIC | Age: 67
End: 2022-05-19
Payer: MEDICARE

## 2022-05-19 NOTE — TELEPHONE ENCOUNTER
----- Message from Lenard Quezada sent at 5/19/2022 12:16 PM CDT -----  Contact: [phone]  Pt needs a call back about an appt she had coming up 06/14/22 that was canceled and she doesn't know why or why she didn't get a call about this. Pt wants a call back from the office.

## 2022-05-19 NOTE — TELEPHONE ENCOUNTER
Spoke w/ pt and notified her that I let a voicemail and mychart message explaining why her appt was cancelled. Pt stated that she read the message and understood. Rescheduled pt's appt to another date.

## 2022-06-08 ENCOUNTER — OFFICE VISIT (OUTPATIENT)
Dept: INTERNAL MEDICINE | Facility: CLINIC | Age: 67
End: 2022-06-08
Payer: MEDICARE

## 2022-06-08 VITALS
BODY MASS INDEX: 36.99 KG/M2 | HEART RATE: 75 BPM | DIASTOLIC BLOOD PRESSURE: 73 MMHG | OXYGEN SATURATION: 96 % | WEIGHT: 222 LBS | HEIGHT: 65 IN | SYSTOLIC BLOOD PRESSURE: 120 MMHG

## 2022-06-08 DIAGNOSIS — Z00.00 PREVENTATIVE HEALTH CARE: ICD-10-CM

## 2022-06-08 DIAGNOSIS — E11.9 TYPE 2 DIABETES MELLITUS WITHOUT COMPLICATION, WITHOUT LONG-TERM CURRENT USE OF INSULIN: ICD-10-CM

## 2022-06-08 DIAGNOSIS — Z13.220 SCREENING CHOLESTEROL LEVEL: ICD-10-CM

## 2022-06-08 DIAGNOSIS — Z00.00 ENCOUNTER FOR GENERAL ADULT MEDICAL EXAMINATION WITHOUT ABNORMAL FINDINGS: Primary | ICD-10-CM

## 2022-06-08 DIAGNOSIS — Z12.11 COLON CANCER SCREENING: ICD-10-CM

## 2022-06-08 LAB
ALBUMIN/CREAT UR: 3.7 UG/MG (ref 0–30)
CREAT UR-MCNC: 352 MG/DL (ref 15–325)
MICROALBUMIN UR DL<=1MG/L-MCNC: 13 UG/ML

## 2022-06-08 PROCEDURE — 99999 PR PBB SHADOW E&M-EST. PATIENT-LVL V: ICD-10-PCS | Mod: PBBFAC,GC,, | Performed by: STUDENT IN AN ORGANIZED HEALTH CARE EDUCATION/TRAINING PROGRAM

## 2022-06-08 PROCEDURE — 99213 OFFICE O/P EST LOW 20 MIN: CPT | Mod: GC,S$GLB,, | Performed by: STUDENT IN AN ORGANIZED HEALTH CARE EDUCATION/TRAINING PROGRAM

## 2022-06-08 PROCEDURE — 82043 UR ALBUMIN QUANTITATIVE: CPT | Performed by: STUDENT IN AN ORGANIZED HEALTH CARE EDUCATION/TRAINING PROGRAM

## 2022-06-08 PROCEDURE — 99999 PR PBB SHADOW E&M-EST. PATIENT-LVL V: CPT | Mod: PBBFAC,GC,, | Performed by: STUDENT IN AN ORGANIZED HEALTH CARE EDUCATION/TRAINING PROGRAM

## 2022-06-08 PROCEDURE — 82570 ASSAY OF URINE CREATININE: CPT | Performed by: STUDENT IN AN ORGANIZED HEALTH CARE EDUCATION/TRAINING PROGRAM

## 2022-06-08 PROCEDURE — 99213 PR OFFICE/OUTPT VISIT, EST, LEVL III, 20-29 MIN: ICD-10-PCS | Mod: GC,S$GLB,, | Performed by: STUDENT IN AN ORGANIZED HEALTH CARE EDUCATION/TRAINING PROGRAM

## 2022-06-08 NOTE — PROGRESS NOTES
Clinic Note  6/8/2022      Subjective:       Patient ID:  Christina is a 66 y.o. female being seen for an established visit.    Chief Complaint: Follow-up    Ms. Carpenter is a 66 F with PMHx of T2DM, recent dx of GCA who presents to clinic for 3 month follow up. Denies headaches, dizziness, changes in vision, chest pain since discharge.     GCA  -followed by ophthalmology   -pending rheumatology office visit 7/12/22 for further workup  -ESR/CRP mildly elevated     T2DM  -meds: metformin 500 BID   -BMI 38 (with sedentary lifestyle)  -blood sugars at home 110s-150s mg/dL  -Diagnosed (3/14/22)       Review of Systems   Constitutional: Negative for chills, diaphoresis, fever, malaise/fatigue and weight loss.   HENT: Negative for sore throat.    Eyes: Negative for blurred vision.   Respiratory: Negative for cough and shortness of breath.    Cardiovascular: Negative for chest pain, palpitations and leg swelling.   Gastrointestinal: Negative for abdominal pain, constipation, diarrhea, heartburn, nausea and vomiting.   Genitourinary: Negative for dysuria.   Musculoskeletal: Negative for joint pain and myalgias.   Neurological: Negative for tingling, weakness and headaches.   Endo/Heme/Allergies: Negative for environmental allergies.   Psychiatric/Behavioral: Negative for depression. The patient does not have insomnia.        Past Medical History:   Diagnosis Date    Edema     HTN (hypertension)        Family History   Problem Relation Age of Onset    Diabetes Mother     Hypertension Mother     Cancer Mother     Coronary artery disease Mother     Stroke Father         reports that she has never smoked. She has never used smokeless tobacco. She reports that she does not drink alcohol and does not use drugs.    Medication List with Changes/Refills   Current Medications    BLOOD SUGAR DIAGNOSTIC STRP    Use 1 strip with meter to check glucose levels 2 (two) times a day.    BLOOD-GLUCOSE METER MISC    Use as directed to  "check glucose levels 2 times daily    LANCETS 33 GAUGE MISC    Use 1 lancet by Misc.(Non-Drug; Combo Route) route 2 (two) times a day.    METFORMIN (GLUCOPHAGE) 500 MG TABLET    Take 1 tablet (500 mg total) by mouth daily with breakfast for 7 days, THEN 1 tablet (500 mg total) 2 (two) times daily with meals.     Review of patient's allergies indicates:  No Known Allergies    Patient Active Problem List   Diagnosis    GCA (giant cell arteritis)    Steroid-induced hyperglycemia    Type 2 diabetes mellitus without complication, without long-term current use of insulin           Objective:      /73 (BP Location: Right arm, Patient Position: Sitting, BP Method: Large (Automatic))   Pulse 75   Ht 5' 5" (1.651 m)   Wt 100.7 kg (222 lb 0.1 oz)   SpO2 96%   BMI 36.94 kg/m²   Estimated body mass index is 36.94 kg/m² as calculated from the following:    Height as of this encounter: 5' 5" (1.651 m).    Weight as of this encounter: 100.7 kg (222 lb 0.1 oz).  Physical Exam  Constitutional:       General: She is not in acute distress.     Appearance: She is obese. She is not diaphoretic.   HENT:      Head: Normocephalic and atraumatic.      Nose: Nose normal.      Mouth/Throat:      Mouth: Mucous membranes are moist.   Eyes:      General: No scleral icterus.     Extraocular Movements: Extraocular movements intact.      Conjunctiva/sclera: Conjunctivae normal.   Neck:      Thyroid: No thyromegaly.   Cardiovascular:      Rate and Rhythm: Normal rate and regular rhythm.      Pulses: Normal pulses.      Heart sounds: Normal heart sounds.   Pulmonary:      Effort: Pulmonary effort is normal.      Breath sounds: Normal breath sounds. No wheezing or rales.   Abdominal:      General: Bowel sounds are normal. There is no distension.      Palpations: Abdomen is soft. There is no mass.      Tenderness: There is no abdominal tenderness.   Musculoskeletal:         General: Normal range of motion.      Cervical back: Normal range " of motion.      Right lower leg: No edema.      Left lower leg: No edema.   Feet:      Right foot:      Protective Sensation: 8 sites tested. 8 sites sensed.      Skin integrity: No ulcer.      Left foot:      Protective Sensation: 8 sites tested. 8 sites sensed.      Skin integrity: No ulcer.     Skin:     General: Skin is warm and dry.      Findings: No rash.   Neurological:      General: No focal deficit present.      Mental Status: She is alert and oriented to person, place, and time.   Psychiatric:         Mood and Affect: Mood and affect normal.         Behavior: Behavior normal.         Thought Content: Thought content normal.         Judgment: Judgment normal.                 Assessment and Plan:   66 F with GCA, T2DM who presents to clinic to follow up. Normotensive on today's visit. Pending Rheumatology appt on 7/12/22.         Rufus was seen today for follow-up.    Diagnoses and all orders for this visit:    Encounter for general adult medical examination without abnormal findings  -     Cancel: Hepatitis C antibody; Future  -     Microalbumin/creatinine urine ratio  -     Cancel: HIV 1/2 Ag/Ab (4th Gen); Future  -     Cancel: Lipid Panel; Future  -     Cancel: Hemoglobin A1C; Future  -     Hemoglobin A1C; Future  -     Hepatitis C antibody; Future  -     HIV 1/2 Ag/Ab (4th Gen); Future  -     Lipid Panel; Future    Preventative health care    Colon cancer screening  -     Case Request Endoscopy: COLONOSCOPY    Screening cholesterol level  -     Cancel: Lipid Panel; Future  -     Lipid Panel; Future    Type 2 diabetes mellitus without complication, without long-term current use of insulin  -     Cancel: Lipid Panel; Future  -     Cancel: Hemoglobin A1C; Future  -     Hemoglobin A1C; Future  -     Lipid Panel; Future          -Metformin 500 BID         -Keep BG log          -Recheck HbA1c in 3 mo        Follow up in about 6 months (around 12/8/2022).    Other Orders Placed This Visit:  Orders Placed  This Encounter   Procedures    Microalbumin/creatinine urine ratio    Hemoglobin A1C    Hepatitis C antibody    HIV 1/2 Ag/Ab (4th Gen)    Lipid Panel         Mahesh Stuart MD  Internal Medicine, PGY-2    Discussed with Dr. Guillen

## 2022-06-08 NOTE — PATIENT INSTRUCTIONS
You were seen today for follow up     After today's visit:   Get labs drawn (we will discuss results)  Someone will reach out to schedule a time for your coloscopy and what you need to do in order to prepare for the procedure  30 minutes of aerobic activity at least 3 times a week along with making changes to your diet to help lower your A1c.

## 2022-06-15 ENCOUNTER — LAB VISIT (OUTPATIENT)
Dept: LAB | Facility: HOSPITAL | Age: 67
End: 2022-06-15
Payer: MEDICARE

## 2022-06-15 DIAGNOSIS — E11.9 TYPE 2 DIABETES MELLITUS WITHOUT COMPLICATION, WITHOUT LONG-TERM CURRENT USE OF INSULIN: ICD-10-CM

## 2022-06-15 DIAGNOSIS — Z13.220 SCREENING CHOLESTEROL LEVEL: ICD-10-CM

## 2022-06-15 DIAGNOSIS — Z00.00 ENCOUNTER FOR GENERAL ADULT MEDICAL EXAMINATION WITHOUT ABNORMAL FINDINGS: ICD-10-CM

## 2022-06-15 LAB
CHOLEST SERPL-MCNC: 242 MG/DL (ref 120–199)
CHOLEST/HDLC SERPL: 5.9 {RATIO} (ref 2–5)
ESTIMATED AVG GLUCOSE: 146 MG/DL (ref 68–131)
HBA1C MFR BLD: 6.7 % (ref 4–5.6)
HDLC SERPL-MCNC: 41 MG/DL (ref 40–75)
HDLC SERPL: 16.9 % (ref 20–50)
LDLC SERPL CALC-MCNC: 167 MG/DL (ref 63–159)
NONHDLC SERPL-MCNC: 201 MG/DL
TRIGL SERPL-MCNC: 170 MG/DL (ref 30–150)

## 2022-06-15 PROCEDURE — 36415 COLL VENOUS BLD VENIPUNCTURE: CPT | Performed by: STUDENT IN AN ORGANIZED HEALTH CARE EDUCATION/TRAINING PROGRAM

## 2022-06-15 PROCEDURE — 87389 HIV-1 AG W/HIV-1&-2 AB AG IA: CPT | Performed by: STUDENT IN AN ORGANIZED HEALTH CARE EDUCATION/TRAINING PROGRAM

## 2022-06-15 PROCEDURE — 86803 HEPATITIS C AB TEST: CPT | Performed by: STUDENT IN AN ORGANIZED HEALTH CARE EDUCATION/TRAINING PROGRAM

## 2022-06-15 PROCEDURE — 80061 LIPID PANEL: CPT | Performed by: STUDENT IN AN ORGANIZED HEALTH CARE EDUCATION/TRAINING PROGRAM

## 2022-06-15 PROCEDURE — 83036 HEMOGLOBIN GLYCOSYLATED A1C: CPT | Performed by: STUDENT IN AN ORGANIZED HEALTH CARE EDUCATION/TRAINING PROGRAM

## 2022-06-16 LAB
HCV AB SERPL QL IA: NEGATIVE
HIV 1+2 AB+HIV1 P24 AG SERPL QL IA: NEGATIVE

## 2022-06-16 NOTE — PROGRESS NOTES
I have reviewed and concur with the resident's history, physical, assessment, and plan.  I did not personally interview or examine the patient at bedside.

## 2022-08-17 ENCOUNTER — TELEPHONE (OUTPATIENT)
Dept: ENDOSCOPY | Facility: HOSPITAL | Age: 67
End: 2022-08-17
Payer: MEDICARE

## 2022-08-17 NOTE — TELEPHONE ENCOUNTER
----- Message from Yariel Keen sent at 8/17/2022 12:29 PM CDT -----  Contact: @[phone]  Pt is calling in to schedule a coloscopy per her primary Dr. Stuart request, please call to discuss further.

## 2022-08-18 ENCOUNTER — TELEPHONE (OUTPATIENT)
Dept: ENDOSCOPY | Facility: HOSPITAL | Age: 67
End: 2022-08-18
Payer: MEDICARE

## 2022-08-18 DIAGNOSIS — Z12.11 COLON CANCER SCREENING: Primary | ICD-10-CM

## 2022-08-18 RX ORDER — POLYETHYLENE GLYCOL 3350, SODIUM SULFATE ANHYDROUS, SODIUM BICARBONATE, SODIUM CHLORIDE, POTASSIUM CHLORIDE 236; 22.74; 6.74; 5.86; 2.97 G/4L; G/4L; G/4L; G/4L; G/4L
4 POWDER, FOR SOLUTION ORAL ONCE
Qty: 4000 ML | Refills: 0 | Status: SHIPPED | OUTPATIENT
Start: 2022-08-18 | End: 2022-10-03

## 2022-09-30 ENCOUNTER — HOSPITAL ENCOUNTER (OUTPATIENT)
Facility: HOSPITAL | Age: 67
Discharge: HOME | End: 2022-09-30
Attending: INTERNAL MEDICINE | Admitting: INTERNAL MEDICINE
Payer: MEDICARE

## 2022-09-30 ENCOUNTER — ANESTHESIA (OUTPATIENT)
Dept: ENDOSCOPY | Facility: HOSPITAL | Age: 67
End: 2022-09-30
Payer: MEDICARE

## 2022-09-30 ENCOUNTER — ANESTHESIA EVENT (OUTPATIENT)
Dept: ENDOSCOPY | Facility: HOSPITAL | Age: 67
End: 2022-09-30
Payer: MEDICARE

## 2022-09-30 VITALS
SYSTOLIC BLOOD PRESSURE: 130 MMHG | HEIGHT: 66 IN | TEMPERATURE: 97 F | HEART RATE: 98 BPM | BODY MASS INDEX: 35.36 KG/M2 | DIASTOLIC BLOOD PRESSURE: 58 MMHG | RESPIRATION RATE: 18 BRPM | WEIGHT: 220 LBS | OXYGEN SATURATION: 96 %

## 2022-09-30 DIAGNOSIS — Z12.11 SCREENING FOR COLON CANCER: ICD-10-CM

## 2022-09-30 DIAGNOSIS — Z12.11 COLON CANCER SCREENING: Primary | ICD-10-CM

## 2022-09-30 DIAGNOSIS — Z12.11 SPECIAL SCREENING FOR MALIGNANT NEOPLASMS, COLON: Primary | ICD-10-CM

## 2022-09-30 LAB — POCT GLUCOSE: 118 MG/DL (ref 70–110)

## 2022-09-30 PROCEDURE — 25000003 PHARM REV CODE 250: Performed by: NURSE ANESTHETIST, CERTIFIED REGISTERED

## 2022-09-30 PROCEDURE — 82962 GLUCOSE BLOOD TEST: CPT | Performed by: INTERNAL MEDICINE

## 2022-09-30 PROCEDURE — G0121 COLON CA SCRN NOT HI RSK IND: HCPCS | Mod: 53,,, | Performed by: INTERNAL MEDICINE

## 2022-09-30 PROCEDURE — 37000009 HC ANESTHESIA EA ADD 15 MINS: Performed by: INTERNAL MEDICINE

## 2022-09-30 PROCEDURE — G0121 COLON CA SCRN NOT HI RSK IND: ICD-10-PCS | Mod: 53,,, | Performed by: INTERNAL MEDICINE

## 2022-09-30 PROCEDURE — E9220 PRA ENDO ANESTHESIA: HCPCS | Mod: ,,, | Performed by: NURSE ANESTHETIST, CERTIFIED REGISTERED

## 2022-09-30 PROCEDURE — E9220 PRA ENDO ANESTHESIA: ICD-10-PCS | Mod: ,,, | Performed by: NURSE ANESTHETIST, CERTIFIED REGISTERED

## 2022-09-30 PROCEDURE — G0121 COLON CA SCRN NOT HI RSK IND: HCPCS | Performed by: INTERNAL MEDICINE

## 2022-09-30 PROCEDURE — 63600175 PHARM REV CODE 636 W HCPCS: Performed by: NURSE ANESTHETIST, CERTIFIED REGISTERED

## 2022-09-30 PROCEDURE — 37000008 HC ANESTHESIA 1ST 15 MINUTES: Performed by: INTERNAL MEDICINE

## 2022-09-30 RX ORDER — PROPOFOL 10 MG/ML
VIAL (ML) INTRAVENOUS
Status: DISCONTINUED | OUTPATIENT
Start: 2022-09-30 | End: 2022-09-30

## 2022-09-30 RX ORDER — LIDOCAINE HYDROCHLORIDE 20 MG/ML
INJECTION INTRAVENOUS
Status: DISCONTINUED | OUTPATIENT
Start: 2022-09-30 | End: 2022-09-30

## 2022-09-30 RX ORDER — SODIUM CHLORIDE 9 MG/ML
INJECTION, SOLUTION INTRAVENOUS CONTINUOUS
Status: DISCONTINUED | OUTPATIENT
Start: 2022-09-30 | End: 2022-09-30 | Stop reason: HOSPADM

## 2022-09-30 RX ORDER — PROPOFOL 10 MG/ML
VIAL (ML) INTRAVENOUS CONTINUOUS PRN
Status: DISCONTINUED | OUTPATIENT
Start: 2022-09-30 | End: 2022-09-30

## 2022-09-30 RX ADMIN — SODIUM CHLORIDE: 9 INJECTION, SOLUTION INTRAVENOUS at 08:09

## 2022-09-30 RX ADMIN — Medication 150 MCG/KG/MIN: at 08:09

## 2022-09-30 RX ADMIN — PROPOFOL 70 MG: 10 INJECTION, EMULSION INTRAVENOUS at 08:09

## 2022-09-30 RX ADMIN — LIDOCAINE HYDROCHLORIDE 30 MG: 20 INJECTION INTRAVENOUS at 08:09

## 2022-09-30 NOTE — PLAN OF CARE
Discharge instructions discussed with pt. Pt verbalizes understanding. Will continue to monitor.

## 2022-09-30 NOTE — H&P
Short Stay Endoscopy History and Physical    PCP - Mahesh Stuart MD    Procedure - Colonoscopy  Sedation: GA  ASA - per anesthesia  Mallampati - per anesthesia  History of Anesthesia problems - no  Family history Anesthesia problems -  no     HPI:  This is a 67 y.o. female here for evaluation of : Screening for CRC    Reflux - no  Dysphagia - no  Abdominal pain - no  Diarrhea - no    ROS:  Constitutional: No fevers, chills, No weight loss  ENT: No allergies  CV: No chest pain  Pulm: No cough, No shortness of breath  Ophtho: No vision changes  GI: see HPI  Medical History:  has a past medical history of Edema and HTN (hypertension).    Surgical History:  has no past surgical history on file.    Family History: family history includes Cancer in her mother; Coronary artery disease in her mother; Diabetes in her mother; Hypertension in her mother; Stroke in her father.. Otherwise no colon cancer, inflammatory bowel disease, or GI malignancies.    Social History:  reports that she has never smoked. She has never used smokeless tobacco. She reports that she does not drink alcohol and does not use drugs.    Review of patient's allergies indicates:  No Known Allergies    Medications:   Medications Prior to Admission   Medication Sig Dispense Refill Last Dose    blood sugar diagnostic Strp Use 1 strip with meter to check glucose levels 2 (two) times a day. 100 each 0     blood-glucose meter Misc Use as directed to check glucose levels 2 times daily (Patient taking differently: Use as directed to check glucose levels 2 times daily) 1 each 0     lancets 33 gauge Misc Use 1 lancet by Misc.(Non-Drug; Combo Route) route 2 (two) times a day. 100 each 0     metFORMIN (GLUCOPHAGE) 500 MG tablet Take 1 tablet (500 mg total) by mouth daily with breakfast for 7 days, THEN 1 tablet (500 mg total) 2 (two) times daily with meals. 727 tablet 0        Objective Findings:    Vital Signs: Per nursing notes.    Physical Exam:  General  Appearance: Well appearing in no acute distress  Head:   Normocephalic, without obvious abnormality  Eyes:    No scleral icterus  Airway: Open  Neck: No restriction in mobility  Lungs: CTA bilaterally in anterior and posterior fields, no wheezes, no crackles.  Heart:  Regular rate and rhythm, S1, S2 normal, no murmurs heard  Abdomen: Soft, non tender, non distended      Labs:  Lab Results   Component Value Date    WBC 8.02 03/13/2022    HGB 13.1 03/13/2022    HCT 41.0 03/13/2022     03/13/2022    CHOL 242 (H) 06/15/2022    TRIG 170 (H) 06/15/2022    HDL 41 06/15/2022    ALT 10 03/15/2022    AST 9 (L) 03/15/2022     03/15/2022    K 3.9 03/15/2022     03/15/2022    CREATININE 0.8 03/15/2022    BUN 20 03/15/2022    CO2 25 03/15/2022    TSH 0.820 06/24/2011    INR 1.0 08/27/2008    HGBA1C 6.7 (H) 06/15/2022         I have explained the risks and benefits of endoscopy procedures to the patient including but not limited to bleeding, perforation, infection, and death.    Thank you so much for allowing me to participate in the care of Rufus Caputo MD

## 2022-09-30 NOTE — PROVATION PATIENT INSTRUCTIONS
Discharge Summary/Instructions after an Endoscopic Procedure  Patient Name: Rufus Carpenter  Patient MRN: [id number]  Patient YOB: 1955 Friday, September 30, 2022  Ozzie Caputo MD  Dear patient,  As a result of recent federal legislation (The Federal Cures Act), you may   receive lab or pathology results from your procedure in your [facility]   account before your physician is able to contact you. Your physician or   their representative will relay the results to you with their   recommendations at their soonest availability.  Thank you,  RESTRICTIONS:  During your procedure today, you received medications for sedation.  These   medications may affect your judgment, balance and coordination.  Therefore,   for 24 hours, you have the following restrictions:   - DO NOT drive a car, operate machinery, make legal/financial decisions,   sign important papers or drink alcohol.    ACTIVITY:  Today: no heavy lifting, straining or running due to procedural   sedation/anesthesia.  The following day: return to full activity including work.  DIET:  Eat and drink normally unless instructed otherwise.     TREATMENT FOR COMMON SIDE EFFECTS:  - Mild abdominal pain, nausea, belching, bloating or excessive gas:  rest,   eat lightly and use a heating pad.  - Sore Throat: treat with throat lozenges and/or gargle with warm salt   water.  - Because air was used during the procedure, expelling large amounts of air   from your rectum or belching is normal.  - If a bowel prep was taken, you may not have a bowel movement for 1-3 days.    This is normal.  SYMPTOMS TO WATCH FOR AND REPORT TO YOUR PHYSICIAN:  1. Abdominal pain or bloating, other than gas cramps.  2. Chest pain.  3. Back pain.  4. Signs of infection such as: chills or fever occurring within 24 hours   after the procedure.  5. Rectal bleeding, which would show as bright red, maroon, or black stools.   (A tablespoon of blood from the rectum is not serious, especially  if   hemorrhoids are present.)  6. Vomiting.  7. Weakness or dizziness.  GO DIRECTLY TO THE NEAREST EMERGENCY ROOM IF YOU HAVE ANY OF THE FOLLOWING:      Difficulty breathing              Chills and/or fever over 101 F   Persistent vomiting and/or vomiting blood   Severe abdominal pain   Severe chest pain   Black, tarry stools   Bleeding- more than one tablespoon   Any other symptom or condition that you feel may need urgent attention  Your doctor recommends these additional instructions:  If any biopsies were taken, your doctors clinic will contact you in 1 to 2   weeks with any results.  - Patient has a contact number available for emergencies.  The signs and   symptoms of potential delayed complications were discussed with the   patient.  Return to normal activities tomorrow.  Written discharge   instructions were provided to the patient.   - Discharge patient to home.   - Repeat colonoscopy at the next available appointment for screening   purposes.   - Resume previous diet.   - Continue present medications.  For questions, problems or results please call your physician - Ozzie Caputo MD at Work:  [phone].  [facility], EMERGENCY ROOM PHONE NUMBER: [phone]  IF A COMPLICATION OR EMERGENCY SITUATION ARISES AND YOU ARE UNABLE TO REACH   YOUR PHYSICIAN - GO DIRECTLY TO THE EMERGENCY ROOM.  Ozzie Caputo MD  9/30/2022 8:39:44 AM  This report has been verified and signed electronically.  Dear patient,  As a result of recent federal legislation (The Federal Cures Act), you may   receive lab or pathology results from your procedure in your [facility]   account before your physician is able to contact you. Your physician or   their representative will relay the results to you with their   recommendations at their soonest availability.  Thank you,  PROVATION

## 2022-09-30 NOTE — ANESTHESIA POSTPROCEDURE EVALUATION
Anesthesia Post Evaluation    Patient: Rufus Carpenter    Procedure(s) Performed: Procedure(s) (LRB):  COLONOSCOPY (N/A)              Anesthetic complications: no                Vitals Value Taken Time   /54 09/30/22 0846   Temp 36.2 °C (97.2 °F) 09/30/22 0846   Pulse 65 09/30/22 0846   Resp 16 09/30/22 0846   SpO2 95 % 09/30/22 0846         No case tracking events are documented in the log.      Pain/Wesley Score: Wesley Score: 9 (9/30/2022  8:47 AM)

## 2022-09-30 NOTE — ANESTHESIA POSTPROCEDURE EVALUATION
Anesthesia Post Evaluation    Patient: Ruufs Carpenter    Procedure(s) Performed: Procedure(s) (LRB):  COLONOSCOPY (N/A)    Final Anesthesia Type: general      Patient location during evaluation: PACU  Patient participation: Yes- Able to Participate  Level of consciousness: awake and alert and oriented  Post-procedure vital signs: reviewed and stable  Pain management: adequate  Airway patency: patent    PONV status at discharge: No PONV      Cardiovascular status: hemodynamically stable  Respiratory status: unassisted and spontaneous ventilation            Vitals Value Taken Time   /54 09/30/22 0846   Temp 36.2 °C (97.2 °F) 09/30/22 0846   Pulse 65 09/30/22 0846   Resp 16 09/30/22 0846   SpO2 95 % 09/30/22 0846         No case tracking events are documented in the log.      Pain/Wesley Score: Wesley Score: 9 (9/30/2022  8:47 AM)

## 2022-09-30 NOTE — ANESTHESIA PREPROCEDURE EVALUATION
09/30/2022  Rufus Carpenter is a 67 y.o., female.      Patient Name: Rufus Carpenter  YOB: 1955  MRN: [id number]  [facility]: [id number]      Code Status: Prior   Date of Procedure: 9/30/2022  Anesthesia: General Procedure: Procedure(s) (LRB):  COLONOSCOPY (N/A)  Pre-Operative Diagnosis: Colon cancer screening [Z12.11]  Proceduralist: Surgeon(s) and Role:     * Ozzie Caputo MD - Primary Registered Nurse: Abby Cohn RN      SUBJECTIVE:   Rufus Carpenter is a 67 y.o. female who  has a past medical history of Edema and HTN (hypertension). No notes on file    ALLERGIES:   Review of patient's allergies indicates:  No Known Allergies  MEDICATIONS:     Current Facility-Administered Medications   Medication Dose Route Frequency Provider Last Rate Last Admin    0.9%  NaCl infusion   Intravenous Continuous Ozzie Caputo MD         Facility-Administered Medications Ordered in Other Encounters   Medication Dose Route Frequency Provider Last Rate Last Admin    LIDOcaine (cardiac) injection   Intravenous PRN Ijeoma Faul, CRNA   30 mg at 09/30/22 0832    propofol (DIPRIVAN) 10 mg/mL infusion   Intravenous PRN Ijeoma Faul, CRNA   70 mg at 09/30/22 0832    propofol (DIPRIVAN) 10 mg/mL infusion   Intravenous Continuous PRN Ijeoma Faul, CRNA 89.82 mL/hr at 09/30/22 0832 150 mcg/kg/min at 09/30/22 0832    sodium chloride 0.9% infusion   Intravenous Continuous PRN Ijeoma Faul, CRNA   New Bag at 09/30/22 0819          History:     Patient Active Problem List   Diagnosis    GCA (giant cell arteritis)    Steroid-induced hyperglycemia    Type 2 diabetes mellitus without complication, without long-term current use of insulin     Surgical History:    has no past surgical history on file.   Social History:    has no history on file for sexual activity.  reports that she has never smoked. She has never  used smokeless tobacco. She reports that she does not drink alcohol and does not use drugs.       Pre-op Assessment    I have reviewed the Patient Summary Reports.     I have reviewed the Nursing Notes. I have reviewed the NPO Status.   I have reviewed the Medications.     Review of Systems  Cardiovascular:   Hypertension        Physical Exam    Airway:  Mallampati: II   Mouth Opening: Normal  TM Distance: Normal  Tongue: Normal  Neck ROM: Normal ROM    Dental:  Intact        Anesthesia Plan  Type of Anesthesia, risks & benefits discussed:    Anesthesia Type: Gen Natural Airway  Intra-op Monitoring Plan: Standard ASA Monitors  Induction:  IV  Informed Consent: Informed consent signed with the Patient and all parties understand the risks and agree with anesthesia plan.  All questions answered.   ASA Score: 2  Day of Surgery Review of History & Physical: H&P Update referred to the surgeon/provider.    Ready For Surgery From Anesthesia Perspective.     .

## 2022-09-30 NOTE — TRANSFER OF CARE
"Anesthesia Transfer of Care Note    Patient: Rufus Carpenter    Procedure(s) Performed: Procedure(s) (LRB):  COLONOSCOPY (N/A)    Patient location: PACU    Anesthesia Type: general    Transport from OR: Transported from OR on room air with adequate spontaneous ventilation    Post pain: adequate analgesia    Post assessment: no apparent anesthetic complications and tolerated procedure well    Post vital signs: stable    Level of consciousness: awake, alert and oriented    Nausea/Vomiting: no nausea/vomiting    Complications: none    Transfer of care protocol was followed      Last vitals:   Visit Vitals  BP (!) 112/56(BP Location: Left arm, Patient Position: Lying)   Pulse 59   Temp 97.8   Resp 16   Ht 5' 6" (1.676 m)   Wt 99.8 kg (220 lb)   SpO2 95%   Breastfeeding No   BMI 35.51 kg/m²     "

## 2022-10-04 DIAGNOSIS — Z12.11 ENCOUNTER FOR SCREENING COLONOSCOPY: Primary | ICD-10-CM

## 2023-02-09 DIAGNOSIS — Z00.00 ENCOUNTER FOR MEDICARE ANNUAL WELLNESS EXAM: ICD-10-CM

## 2023-05-22 ENCOUNTER — PES CALL (OUTPATIENT)
Dept: ADMINISTRATIVE | Facility: CLINIC | Age: 68
End: 2023-05-22
Payer: MEDICARE

## 2025-07-18 ENCOUNTER — OFFICE VISIT (OUTPATIENT)
Facility: CLINIC | Age: 70
End: 2025-07-18
Payer: MEDICARE

## 2025-07-18 VITALS
RESPIRATION RATE: 14 BRPM | TEMPERATURE: 98 F | DIASTOLIC BLOOD PRESSURE: 80 MMHG | BODY MASS INDEX: 37.21 KG/M2 | WEIGHT: 231.5 LBS | SYSTOLIC BLOOD PRESSURE: 160 MMHG | HEIGHT: 66 IN

## 2025-07-18 DIAGNOSIS — Z12.11 COLON CANCER SCREENING: ICD-10-CM

## 2025-07-18 DIAGNOSIS — I10 PRIMARY HYPERTENSION: Primary | ICD-10-CM

## 2025-07-18 DIAGNOSIS — E11.9 TYPE 2 DIABETES MELLITUS WITHOUT COMPLICATION, WITHOUT LONG-TERM CURRENT USE OF INSULIN: ICD-10-CM

## 2025-07-18 DIAGNOSIS — Z78.0 POSTMENOPAUSAL: ICD-10-CM

## 2025-07-18 DIAGNOSIS — H53.8 BLURRED VISION, BILATERAL: ICD-10-CM

## 2025-07-18 DIAGNOSIS — Z12.31 BREAST CANCER SCREENING BY MAMMOGRAM: ICD-10-CM

## 2025-07-18 DIAGNOSIS — E78.5 HYPERLIPIDEMIA, UNSPECIFIED HYPERLIPIDEMIA TYPE: ICD-10-CM

## 2025-07-18 PROCEDURE — 1159F MED LIST DOCD IN RCRD: CPT | Mod: CPTII,S$GLB,, | Performed by: STUDENT IN AN ORGANIZED HEALTH CARE EDUCATION/TRAINING PROGRAM

## 2025-07-18 PROCEDURE — 1158F ADVNC CARE PLAN TLK DOCD: CPT | Mod: CPTII,S$GLB,, | Performed by: STUDENT IN AN ORGANIZED HEALTH CARE EDUCATION/TRAINING PROGRAM

## 2025-07-18 PROCEDURE — 3079F DIAST BP 80-89 MM HG: CPT | Mod: CPTII,S$GLB,, | Performed by: STUDENT IN AN ORGANIZED HEALTH CARE EDUCATION/TRAINING PROGRAM

## 2025-07-18 PROCEDURE — 1160F RVW MEDS BY RX/DR IN RCRD: CPT | Mod: CPTII,S$GLB,, | Performed by: STUDENT IN AN ORGANIZED HEALTH CARE EDUCATION/TRAINING PROGRAM

## 2025-07-18 PROCEDURE — G2211 COMPLEX E/M VISIT ADD ON: HCPCS | Mod: S$GLB,,, | Performed by: STUDENT IN AN ORGANIZED HEALTH CARE EDUCATION/TRAINING PROGRAM

## 2025-07-18 PROCEDURE — 3077F SYST BP >= 140 MM HG: CPT | Mod: CPTII,S$GLB,, | Performed by: STUDENT IN AN ORGANIZED HEALTH CARE EDUCATION/TRAINING PROGRAM

## 2025-07-18 PROCEDURE — 99999 PR PBB SHADOW E&M-EST. PATIENT-LVL IV: CPT | Mod: PBBFAC,,, | Performed by: STUDENT IN AN ORGANIZED HEALTH CARE EDUCATION/TRAINING PROGRAM

## 2025-07-18 PROCEDURE — 3008F BODY MASS INDEX DOCD: CPT | Mod: CPTII,S$GLB,, | Performed by: STUDENT IN AN ORGANIZED HEALTH CARE EDUCATION/TRAINING PROGRAM

## 2025-07-18 PROCEDURE — 99205 OFFICE O/P NEW HI 60 MIN: CPT | Mod: S$GLB,,, | Performed by: STUDENT IN AN ORGANIZED HEALTH CARE EDUCATION/TRAINING PROGRAM

## 2025-07-18 NOTE — PROGRESS NOTES
[facility] INITIAL VISIT NOTE      CHIEF COMPLAINT   Establish care  HPI     Rufus Carpenter is a 69 y.o. female with a PMHx of DM, only briefly treated and elevated blood pressure (never treated ) who presents to establish care after an insurance lapse.     Record review shows   History of presumed GCA (biopsy declined) and given solumedrol.  Symptoms resolved  Diagnosed with DM and treated with metformin 500mg BID which she no longer takes    Health Goal:   1) Make sure stay healthy.     Only medicine is one-a-day vitamin     Nutrition   Weight is stable   Breakfast - eggs and rice  Lunch and dinner - home cooked food.   Not so much with dessert  Beverages:  iced tea zero, frozen water, cranberry juice    Wt Readings from Last 3 Encounters:   07/18/25 105 kg (231 lb 7.7 oz)   09/30/22 99.8 kg (220 lb)   06/08/22 100.7 kg (222 lb 0.1 oz)        CHRONIC HEALTH ISSUES:     Diabetes  - Medication: stopped taking metformin.   A1C The patient doesn't have any registry metric data available   Lab Results   Component Value Date    HGBA1C 6.7 (H) 06/15/2022    HGBA1C 6.8 (H) 03/14/2022      Lipids  Lab Results   Component Value Date    LDLCALC 167.0 (H) 06/15/2022      The ASCVD Risk score (Myra PAREKH, et al., 2019) failed to calculate for the following reasons:    Cannot find a previous HDL lab    Cannot find a previous total cholesterol lab     Hypertension  BP today: 160/80  Medications:    CKD  BMP  Lab Results   Component Value Date     03/15/2022    K 3.9 03/15/2022     03/15/2022    CO2 25 03/15/2022    BUN 20 03/15/2022    CREATININE 0.8 03/15/2022    CALCIUM 9.4 03/15/2022    ANIONGAP 12 03/15/2022       MUSCULOSKELETAL:  She reports ongoing wrist pain characterized by sharp, shooting sensations that worsen with pressure, describing the pain intensity as similar to a toothache. She experiences ankle swelling with prolonged activity and lack of rest, noting the swelling occurs after  extended periods without sleep.    OCULAR:  She reports blurry vision and indicates it has been approximately one year since her last eye exam. She acknowledges potential need for new corrective lenses.    SLEEP:  She reports disrupted nocturnal sleep pattern with difficulty maintaining continuous sleep. She experiences early morning awakening at 1:32 AM after going to bed early, unable to return to sleep. Her preferred rest occurs during daytime hours rather than at night. She describes insufficient total sleep duration and fragmented sleep cycle.    DIET:  She reports eating eggs and rice for breakfast, acknowledging potential overconsumption of rice. She primarily cooks meals at home, avoiding fast food, and prepares a mixture of cooking styles including oven-baked items. She demonstrates limited sweet consumption, occasionally eating a cookie or honey bun but preferring healthier alternatives like bananas. Her hydration consists primarily of zero-calorie iced tea, which she drinks in large quantities. She notes inconsistent eating patterns, sometimes skipping meals when at home.    MEDICAL HISTORY:  She has a history of borderline diabetes 2-3 years ago, for which she was prescribed medication but discontinued due to difficulty swallowing. She had a previous emergency room visit for a headache with concerns about potential artery inflammation, which she attributes to tension. At the time of the ER visit, a procedure involving a cut was suggested but declined.      ROS:  General: -fever, -chills, -fatigue, -weight gain, -weight loss  Eyes: -vision changes, -redness, -discharge, +blurry vision  ENT: -ear pain, -nasal congestion, -sore throat  Cardiovascular: -chest pain, -palpitations, +lower extremity edema  Respiratory: -cough, -shortness of breath  Gastrointestinal: -abdominal pain, -nausea, -vomiting, -diarrhea, -constipation, -blood in stool  Genitourinary: -dysuria, -hematuria, -frequency  Musculoskeletal:  -joint pain, -muscle pain, +limb pain, +pain with movement, +shooting pain sensation  Skin: -rash, -lesion  Neurological: -headache, -dizziness, -numbness, -tingling, +difficulty staying asleep  Psychiatric: -anxiety, -depression, +sleep difficulty  Head: +irritable if meals are missed             Past Medical History:  Past Medical History:   Diagnosis Date    Diabetes mellitus, type 2     Edema     HTN (hypertension)        Geriatric Assessment    ADLs : independent  Polypharmacy:  Visual impairments:   Hearing impairment:    Urinary incontinence:  no   Malnutrition? No   Gait/balance/falls: no    Depression: PHQ2. No   Sleep: sometimes wakes up 1:30 am if in bed by 0900  Cognitive Problems: minicog.   Environmental/social problems:   Functional status:   Support system:  family support, daughter is a pediatric nurse     Advanced care planning:    Date: 07/18/2025    Power of   I initiated the process of voluntary advance care planning today and explained the importance of this process to the patient.  I introduced the concept of advance directives to the patient, as well. Then the patient received detailed information about the importance of designating a Health Care Power of  (HCPOA). She was also instructed to communicate with this person about their wishes for future healthcare, should she become sick and lose decision-making capacity. The patient has previously appointed a HCPOA. After our discussion, the patient has decided to complete a HCPOA and has appointed her spouse and daughters (daughter is a nurse) , health care agent: see emergency contact & health care agent number: on file. I encouraged her to communicate with this person about their wishes for future healthcare, should she become sick and lose decision-making capacity.      A total of 5 min was spent on advance care planning, goals of care discussion, emotional support, formulating and communicating prognosis and exploring  burden/benefit of various approaches of treatment. This discussion occurred on a fully voluntary basis with the verbal consent of the patient and/or family.         Past Surgical History:  Past Surgical History:   Procedure Laterality Date    COLONOSCOPY N/A 09/30/2022    Procedure: COLONOSCOPY;  Surgeon: Ozzie Caputo MD;  Location: [address];  Service: Endoscopy;  Laterality: N/A;  vaccinated-GT        instr mailed  9/30 SWP-AWARE OF NEW ARRIVAL TIME-RT       Allergies:  Review of patient's allergies indicates:  No Known Allergies    Home Medications:  Prior to Admission medications    Medication Sig Start Date End Date Taking? Authorizing Provider   blood sugar diagnostic Strp Use 1 strip with meter to check glucose levels 2 (two) times a day. 3/14/22   Yenifer Pugh MD   blood-glucose meter Misc Use as directed to check glucose levels 2 times daily  Patient taking differently: Use as directed to check glucose levels 2 times daily 3/14/22   Yenifer Pugh MD   lancets 33 gauge Misc Use 1 lancet by Misc.(Non-Drug; Combo Route) route 2 (two) times a day. 3/14/22   Yenifer Pugh MD   metFORMIN (GLUCOPHAGE) 500 MG tablet Take 1 tablet (500 mg total) by mouth daily with breakfast for 7 days, THEN 1 tablet (500 mg total) 2 (two) times daily with meals. 3/15/22 3/22/23  Yenifer Pugh MD   polyethylene glycol (GOLYTELY) 236-22.74-6.74 -5.86 gram suspension TAKE 4000 ML BY MOUTH 1 TIME FOR 1 DOSE 10/3/22   Ozzie Caputo MD       Family History:  Family History   Problem Relation Name Age of Onset    Diabetes Mother      Hypertension Mother      Kidney cancer Mother         Social History:  Social History     Socioeconomic History    Marital status:      Spouse name: Franko Carpenter    Number of children: 6   Occupational History    Occupation: floral delivery   Tobacco Use    Smoking status: Never    Smokeless tobacco: Never   Substance and Sexual Activity    Alcohol use: No    Drug  "use: No    Sexual activity: Yes     Partners: Male   Social History Narrative    Lives with  of 50 years Franko Carpenter     6 children (3 biologic, 3 adopted)     Takes grandchild to school and helps care for him.     Busy with family, enjoys walking in the park       Health Maintainence:   Health Maintenance Due   Topic Date Due    DEXA Scan  Never done    RSV Vaccine (Age 60+ and Pregnant patients) (1 - Risk 60-74 years 1-dose series) Never done    TETANUS VACCINE  06/22/2021    Mammogram  09/02/2023    COVID-19 Vaccine (6 - 2024-25 season) 09/01/2024          PHYSICAL EXAM     BP (!) 160/80 (BP Location: Right arm, Patient Position: Sitting) Comment: she drank coffee in the waiting room  Temp 98 °F (36.7 °C)   Resp 14   Ht 5' 6" (1.676 m)   Wt 105 kg (231 lb 7.7 oz)   BMI 37.36 kg/m²     Physical Exam  Constitutional:       Appearance: Normal appearance.   HENT:      Head: Normocephalic and atraumatic.      Right Ear: Ear canal normal.      Left Ear: Ear canal normal.      Nose: Nose normal.      Mouth/Throat:      Mouth: Mucous membranes are moist.      Pharynx: Oropharynx is clear.   Eyes:      Extraocular Movements: Extraocular movements intact.      Conjunctiva/sclera: Conjunctivae normal.      Pupils: Pupils are equal, round, and reactive to light.   Cardiovascular:      Rate and Rhythm: Normal rate and regular rhythm.      Pulses: Normal pulses.      Heart sounds: Normal heart sounds.   Pulmonary:      Effort: Pulmonary effort is normal.      Breath sounds: Normal breath sounds.   Abdominal:      General: Bowel sounds are normal.      Palpations: Abdomen is soft.   Musculoskeletal:         General: Normal range of motion.      Cervical back: Normal range of motion and neck supple.      Right lower leg: No edema.      Left lower leg: No edema.      Comments: Wrist pain - left - Tinell's test negative.   Skin:     General: Skin is warm and dry.   Neurological:      General: No focal deficit present. "      Mental Status: She is alert and oriented to person, place, and time.   Psychiatric:         Mood and Affect: Mood normal.         Behavior: Behavior normal.            LABS     Previous labs reviewed.    Lab Results   Component Value Date    WBC 8.02 03/13/2022    HGB 13.1 03/13/2022    HCT 41.0 03/13/2022     03/13/2022    CHOL 242 (H) 06/15/2022    TRIG 170 (H) 06/15/2022    HDL 41 06/15/2022    ALT 10 03/15/2022    AST 9 (L) 03/15/2022     03/15/2022    K 3.9 03/15/2022     03/15/2022    CREATININE 0.8 03/15/2022    BUN 20 03/15/2022    CO2 25 03/15/2022    TSH 0.820 06/24/2011    INR 1.0 08/27/2008    HGBA1C 6.7 (H) 06/15/2022         ASSESSMENT/PLAN     Rufus Carpenter is a 69 y.o. female who presents to establish care and address chronic medical conditions.   1. Primary hypertension  Comments:  Declined starting medication today.  She will keep a home log.  Has BP cuff.  She will decrease salt intake and obtain full lab panel.    2. Type 2 diabetes mellitus without complication, without long-term current use of insulin  -     CBC Auto Differential; Future; Expected date: 10/18/2025  -     Comprehensive Metabolic Panel; Future; Expected date: 07/18/2025  -     Lipid Panel; Future; Expected date: 07/18/2025  -     TSH; Future; Expected date: 07/18/2025  -     Hemoglobin A1C; Future; Expected date: 07/18/2025  -     Urinalysis, Reflex to Urine Culture Urine, Clean Catch; Future; Expected date: 07/18/2025  -     Microalbumin/Creatinine Ratio, Urine; Future; Expected date: 07/18/2025  -     Ambulatory referral/consult to Optometry; Future; Expected date: 07/25/2025    3. Hyperlipidemia, unspecified hyperlipidemia type    4. Colon cancer screening  -     Ambulatory referral/consult to Endo Procedure ; Future; Expected date: 07/19/2025    5. Breast cancer screening by mammogram  -     Mammo Digital Screening Bilat; Future; Expected date: 07/18/2025    6. Postmenopausal  -     DXA  Bone Density Appendicular Skeleton; Future; Expected date: 07/18/2025    7. Blurred vision, bilateral  -     Ambulatory referral/consult to Optometry; Future; Expected date: 07/25/2025       Assessment & Plan    E11.9 Type 2 diabetes mellitus without complication, without long-term current use of insulin  BMI >35  I10 Essential (primary) hypertension  M25.539 Pain in unspecified wrist  Z86.39 Personal history of other endocrine, nutritional and metabolic disease  R53.83 Other fatigue    TYPE 2 DIABETES MELLITUS WITHOUT COMPLICATION, WITHOUT LONG-TERM CURRENT USE OF INSULIN:   Evaluated patient's diabetes management; patient discontinued previously prescribed metformin and stopped checking glucose levels despite having a glucometer at home.   Educated patient on importance of diabetes management to prevent long-term complications.   Ordered comprehensive fasting labs including diabetes, thyroid, and renal function tests.   Will consider restarting metformin based on upcoming lab results.    SEVERE OBESITY (BMI 35.0-39.9) WITH COMORBIDITY:   Monitored patient's weight, currently stable at 231 lbs but has increased from 220 lbs 3 years ago.   Discussed initiating an exercise program for weight management and referred patient to health  [facility]ara for personalized fitness instruction, exercise plan, and nutrition guidance.    ESSENTIAL HYPERTENSION:   Blood pressure currently elevated at 160/90, increased from previous reading of 148/90 in 2022.   Potential contributing factors include rushing and caffeine intake.   Patient declined nitiating antihypertensive medication pending lab results and home blood pressure readings.   Instructed patient to maintain blood pressure log using personal sphygmomanometer.   Recommend low-sodium version of Mina's seasoning to reduce sodium intake.   Educated on importance of managing hypertension to prevent long-term health complications.    WRIST PAIN:   Idaetta reports occasional  wrist joint pain described as toothache-like discomfort.   Pain pattern suggests tendinitis or arthritis from repetitive motions rather than carpal tunnel syndrome.   Recommend topical diclofenac gel or acetaminophen for pain relief.    HISTORY OF PREDIABETES:   Rufus has history of prediabetes with previous medication management.    FATIGUE:   Rufus reports weakness associated with prolonged fasting throughout the day.    CARDIOVASCULAR RISK MANAGEMENT:   Assessed cardiovascular risk factors and need for cholesterol management.   Educated on importance   of managing cholesterol and the role of diet and exercise in preventing long-term health complications.   Ordered comprehensive fasting lab work including lipid panel, thyroid function tests, urinalysis, and other relevant tests.    FOLLOW-UP:   Scheduled for August 1st to review lab results and discuss potential medication changes.   Rufus advised to contact office if symptoms worsen or new concerns arise.           Total time spent on this encounter was 70 minutes.      Mandi Terry MD  Internal Medicine  [facility]

## 2025-07-23 ENCOUNTER — TELEPHONE (OUTPATIENT)
Dept: ENDOSCOPY | Facility: HOSPITAL | Age: 70
End: 2025-07-23
Payer: MEDICARE

## 2025-07-24 ENCOUNTER — LAB VISIT (OUTPATIENT)
Dept: LAB | Facility: HOSPITAL | Age: 70
End: 2025-07-24
Payer: MEDICARE

## 2025-07-24 DIAGNOSIS — E11.9 TYPE 2 DIABETES MELLITUS WITHOUT COMPLICATION, WITHOUT LONG-TERM CURRENT USE OF INSULIN: ICD-10-CM

## 2025-07-24 LAB
ABSOLUTE EOSINOPHIL (OHS): 0.21 K/UL
ABSOLUTE MONOCYTE (OHS): 0.44 K/UL (ref 0.3–1)
ABSOLUTE NEUTROPHIL COUNT (OHS): 3.28 K/UL (ref 1.8–7.7)
ALBUMIN SERPL BCP-MCNC: 4 G/DL (ref 3.5–5.2)
ALP SERPL-CCNC: 120 UNIT/L (ref 40–150)
ALT SERPL W/O P-5'-P-CCNC: 13 UNIT/L (ref 10–44)
ANION GAP (OHS): 9 MMOL/L (ref 8–16)
AST SERPL-CCNC: 20 UNIT/L (ref 11–45)
BASOPHILS # BLD AUTO: 0.08 K/UL
BASOPHILS NFR BLD AUTO: 1.2 %
BILIRUB SERPL-MCNC: 0.4 MG/DL (ref 0.1–1)
BUN SERPL-MCNC: 16 MG/DL (ref 8–23)
CALCIUM SERPL-MCNC: 9.2 MG/DL (ref 8.7–10.5)
CHLORIDE SERPL-SCNC: 108 MMOL/L (ref 95–110)
CHOLEST SERPL-MCNC: 219 MG/DL (ref 120–199)
CHOLEST/HDLC SERPL: 5.1 {RATIO} (ref 2–5)
CO2 SERPL-SCNC: 25 MMOL/L (ref 23–29)
CREAT SERPL-MCNC: 0.8 MG/DL (ref 0.5–1.4)
EAG (OHS): 157 MG/DL (ref 68–131)
ERYTHROCYTE [DISTWIDTH] IN BLOOD BY AUTOMATED COUNT: 14.1 % (ref 11.5–14.5)
GFR SERPLBLD CREATININE-BSD FMLA CKD-EPI: >60 ML/MIN/1.73/M2
GLUCOSE SERPL-MCNC: 127 MG/DL (ref 70–110)
HBA1C MFR BLD: 7.1 % (ref 4–5.6)
HCT VFR BLD AUTO: 41.6 % (ref 37–48.5)
HDLC SERPL-MCNC: 43 MG/DL (ref 40–75)
HDLC SERPL: 19.6 % (ref 20–50)
HGB BLD-MCNC: 13 GM/DL (ref 12–16)
IMM GRANULOCYTES # BLD AUTO: 0.03 K/UL (ref 0–0.04)
IMM GRANULOCYTES NFR BLD AUTO: 0.4 % (ref 0–0.5)
LDLC SERPL CALC-MCNC: 143 MG/DL (ref 63–159)
LYMPHOCYTES # BLD AUTO: 2.68 K/UL (ref 1–4.8)
MCH RBC QN AUTO: 26.3 PG (ref 27–31)
MCHC RBC AUTO-ENTMCNC: 31.3 G/DL (ref 32–36)
MCV RBC AUTO: 84 FL (ref 82–98)
NONHDLC SERPL-MCNC: 176 MG/DL
NUCLEATED RBC (/100WBC) (OHS): 0 /100 WBC
PLATELET # BLD AUTO: 284 K/UL (ref 150–450)
PMV BLD AUTO: 10.7 FL (ref 9.2–12.9)
POTASSIUM SERPL-SCNC: 4.5 MMOL/L (ref 3.5–5.1)
PROT SERPL-MCNC: 7.9 GM/DL (ref 6–8.4)
RBC # BLD AUTO: 4.94 M/UL (ref 4–5.4)
RELATIVE EOSINOPHIL (OHS): 3.1 %
RELATIVE LYMPHOCYTE (OHS): 39.9 % (ref 18–48)
RELATIVE MONOCYTE (OHS): 6.5 % (ref 4–15)
RELATIVE NEUTROPHIL (OHS): 48.9 % (ref 38–73)
SODIUM SERPL-SCNC: 142 MMOL/L (ref 136–145)
TRIGL SERPL-MCNC: 165 MG/DL (ref 30–150)
TSH SERPL-ACNC: 2.56 UIU/ML (ref 0.4–4)
WBC # BLD AUTO: 6.72 K/UL (ref 3.9–12.7)

## 2025-07-24 PROCEDURE — 82465 ASSAY BLD/SERUM CHOLESTEROL: CPT

## 2025-07-24 PROCEDURE — 84155 ASSAY OF PROTEIN SERUM: CPT

## 2025-07-24 PROCEDURE — 84443 ASSAY THYROID STIM HORMONE: CPT

## 2025-07-24 PROCEDURE — 36415 COLL VENOUS BLD VENIPUNCTURE: CPT | Mod: PO

## 2025-07-24 PROCEDURE — 85025 COMPLETE CBC W/AUTO DIFF WBC: CPT

## 2025-07-24 PROCEDURE — 83036 HEMOGLOBIN GLYCOSYLATED A1C: CPT

## 2025-07-28 DIAGNOSIS — Z00.00 ENCOUNTER FOR MEDICARE ANNUAL WELLNESS EXAM: ICD-10-CM

## 2025-07-30 ENCOUNTER — TELEPHONE (OUTPATIENT)
Dept: ENDOSCOPY | Facility: HOSPITAL | Age: 70
End: 2025-07-30
Payer: MEDICARE

## 2025-07-30 NOTE — TELEPHONE ENCOUNTER
Endoscopy Scheduling Department  [facility] [address] [place] [address]  Take the [facility] Elevators to 4th Floor Endoscopy Lab      Date: 07/30/2025      Medical Record # [id number]        Dear  Rufus Carpenter      An order for the following procedure(s) Colonoscopy was placed for you by   Mandi Terry MD.    Since multiple attempts have been made to get in touch with you, this is the last notification. Please call the scheduling nurse to schedule this procedure as soon as possible.    If you have already scheduled this appointment, please disregard this letter. If you would like to cancel this request, please call the number listed below.     Sincerely,        Endoscopy Scheduling Department [phone]        Comments: Office hours are Monday through Friday 8-430p.

## 2025-07-30 NOTE — TELEPHONE ENCOUNTER
Contacted the patient to schedule an endoscopy procedure(s) Colonoscopy . The patient did not answer the call and left a voice message requesting a call back.  A letter will be mailed to the address on file and a message will be sent to the ordering provider. 2nd attempt

## 2025-08-01 ENCOUNTER — OFFICE VISIT (OUTPATIENT)
Facility: CLINIC | Age: 70
End: 2025-08-01
Payer: MEDICARE

## 2025-08-01 VITALS
BODY MASS INDEX: 37.33 KG/M2 | WEIGHT: 232.25 LBS | TEMPERATURE: 97 F | HEIGHT: 66 IN | RESPIRATION RATE: 18 BRPM | SYSTOLIC BLOOD PRESSURE: 150 MMHG | HEART RATE: 80 BPM | OXYGEN SATURATION: 99 % | DIASTOLIC BLOOD PRESSURE: 104 MMHG

## 2025-08-01 DIAGNOSIS — I10 PRIMARY HYPERTENSION: ICD-10-CM

## 2025-08-01 DIAGNOSIS — E78.5 DYSLIPIDEMIA, GOAL LDL BELOW 70: ICD-10-CM

## 2025-08-01 DIAGNOSIS — J30.9 ALLERGIC RHINITIS, UNSPECIFIED SEASONALITY, UNSPECIFIED TRIGGER: ICD-10-CM

## 2025-08-01 DIAGNOSIS — E66.01 SEVERE OBESITY (BMI 35.0-39.9) WITH COMORBIDITY: ICD-10-CM

## 2025-08-01 DIAGNOSIS — E11.9 TYPE 2 DIABETES MELLITUS WITHOUT COMPLICATION, WITHOUT LONG-TERM CURRENT USE OF INSULIN: Primary | ICD-10-CM

## 2025-08-01 DIAGNOSIS — R09.81 SINUS CONGESTION: ICD-10-CM

## 2025-08-01 LAB
AMORPH CRY UR QL COMP ASSIST: ABNORMAL
BILIRUB UR QL STRIP.AUTO: NEGATIVE
CLARITY UR: ABNORMAL
COLOR UR AUTO: ABNORMAL
CTP QC/QA: YES
EPITH CASTS #/AREA UR COMP ASSIST: 1 /LPF (ref ?–0)
GLUCOSE UR QL STRIP: NEGATIVE
HGB UR QL STRIP: NEGATIVE
HYALINE CASTS UR QL AUTO: 0 /LPF (ref 0–1)
KETONES UR QL STRIP: NEGATIVE
LEUKOCYTE ESTERASE UR QL STRIP: ABNORMAL
MICROSCOPIC COMMENT: ABNORMAL
NITRITE UR QL STRIP: NEGATIVE
PH UR STRIP: 6 [PH]
PROT UR QL STRIP: NEGATIVE
SARS-COV+SARS-COV-2 AG RESP QL IA.RAPID: NEGATIVE
SP GR UR STRIP: 1.02
UNSPECIFIED CRY UR QL COMP ASSIST: ABNORMAL
UROBILINOGEN UR STRIP-ACNC: NEGATIVE EU/DL
WBC #/AREA URNS AUTO: 4 /HPF (ref 0–5)

## 2025-08-01 PROCEDURE — 4010F ACE/ARB THERAPY RXD/TAKEN: CPT | Mod: CPTII,S$GLB,, | Performed by: STUDENT IN AN ORGANIZED HEALTH CARE EDUCATION/TRAINING PROGRAM

## 2025-08-01 PROCEDURE — 3066F NEPHROPATHY DOC TX: CPT | Mod: CPTII,S$GLB,, | Performed by: STUDENT IN AN ORGANIZED HEALTH CARE EDUCATION/TRAINING PROGRAM

## 2025-08-01 PROCEDURE — 81003 URINALYSIS AUTO W/O SCOPE: CPT | Performed by: STUDENT IN AN ORGANIZED HEALTH CARE EDUCATION/TRAINING PROGRAM

## 2025-08-01 PROCEDURE — 99215 OFFICE O/P EST HI 40 MIN: CPT | Mod: S$GLB,,, | Performed by: STUDENT IN AN ORGANIZED HEALTH CARE EDUCATION/TRAINING PROGRAM

## 2025-08-01 PROCEDURE — 99999 PR PBB SHADOW E&M-EST. PATIENT-LVL IV: CPT | Mod: PBBFAC,,, | Performed by: STUDENT IN AN ORGANIZED HEALTH CARE EDUCATION/TRAINING PROGRAM

## 2025-08-01 PROCEDURE — 1101F PT FALLS ASSESS-DOCD LE1/YR: CPT | Mod: CPTII,S$GLB,, | Performed by: STUDENT IN AN ORGANIZED HEALTH CARE EDUCATION/TRAINING PROGRAM

## 2025-08-01 PROCEDURE — 1126F AMNT PAIN NOTED NONE PRSNT: CPT | Mod: CPTII,S$GLB,, | Performed by: STUDENT IN AN ORGANIZED HEALTH CARE EDUCATION/TRAINING PROGRAM

## 2025-08-01 PROCEDURE — 1159F MED LIST DOCD IN RCRD: CPT | Mod: CPTII,S$GLB,, | Performed by: STUDENT IN AN ORGANIZED HEALTH CARE EDUCATION/TRAINING PROGRAM

## 2025-08-01 PROCEDURE — G2211 COMPLEX E/M VISIT ADD ON: HCPCS | Mod: S$GLB,,, | Performed by: STUDENT IN AN ORGANIZED HEALTH CARE EDUCATION/TRAINING PROGRAM

## 2025-08-01 PROCEDURE — 3051F HG A1C>EQUAL 7.0%<8.0%: CPT | Mod: CPTII,S$GLB,, | Performed by: STUDENT IN AN ORGANIZED HEALTH CARE EDUCATION/TRAINING PROGRAM

## 2025-08-01 PROCEDURE — 82570 ASSAY OF URINE CREATININE: CPT | Performed by: STUDENT IN AN ORGANIZED HEALTH CARE EDUCATION/TRAINING PROGRAM

## 2025-08-01 PROCEDURE — 87811 SARS-COV-2 COVID19 W/OPTIC: CPT | Mod: QW,S$GLB,, | Performed by: STUDENT IN AN ORGANIZED HEALTH CARE EDUCATION/TRAINING PROGRAM

## 2025-08-01 PROCEDURE — 3061F NEG MICROALBUMINURIA REV: CPT | Mod: CPTII,S$GLB,, | Performed by: STUDENT IN AN ORGANIZED HEALTH CARE EDUCATION/TRAINING PROGRAM

## 2025-08-01 PROCEDURE — 1157F ADVNC CARE PLAN IN RCRD: CPT | Mod: CPTII,S$GLB,, | Performed by: STUDENT IN AN ORGANIZED HEALTH CARE EDUCATION/TRAINING PROGRAM

## 2025-08-01 PROCEDURE — 3077F SYST BP >= 140 MM HG: CPT | Mod: CPTII,S$GLB,, | Performed by: STUDENT IN AN ORGANIZED HEALTH CARE EDUCATION/TRAINING PROGRAM

## 2025-08-01 PROCEDURE — 3288F FALL RISK ASSESSMENT DOCD: CPT | Mod: CPTII,S$GLB,, | Performed by: STUDENT IN AN ORGANIZED HEALTH CARE EDUCATION/TRAINING PROGRAM

## 2025-08-01 PROCEDURE — 3080F DIAST BP >= 90 MM HG: CPT | Mod: CPTII,S$GLB,, | Performed by: STUDENT IN AN ORGANIZED HEALTH CARE EDUCATION/TRAINING PROGRAM

## 2025-08-01 PROCEDURE — 1160F RVW MEDS BY RX/DR IN RCRD: CPT | Mod: CPTII,S$GLB,, | Performed by: STUDENT IN AN ORGANIZED HEALTH CARE EDUCATION/TRAINING PROGRAM

## 2025-08-01 PROCEDURE — 3008F BODY MASS INDEX DOCD: CPT | Mod: CPTII,S$GLB,, | Performed by: STUDENT IN AN ORGANIZED HEALTH CARE EDUCATION/TRAINING PROGRAM

## 2025-08-01 RX ORDER — LEVOCETIRIZINE DIHYDROCHLORIDE 5 MG/1
5 TABLET, FILM COATED ORAL NIGHTLY
Qty: 30 TABLET | Refills: 11 | Status: SHIPPED | OUTPATIENT
Start: 2025-08-01 | End: 2026-08-01

## 2025-08-01 RX ORDER — SEMAGLUTIDE 0.68 MG/ML
0.25 INJECTION, SOLUTION SUBCUTANEOUS
Qty: 1.5 ML | Refills: 0 | Status: SHIPPED | OUTPATIENT
Start: 2025-08-01 | End: 2025-08-29

## 2025-08-01 RX ORDER — ROSUVASTATIN CALCIUM 5 MG/1
5 TABLET, COATED ORAL DAILY
Qty: 90 TABLET | Refills: 3 | Status: SHIPPED | OUTPATIENT
Start: 2025-08-01 | End: 2026-08-01

## 2025-08-01 RX ORDER — OLMESARTAN MEDOXOMIL 20 MG/1
20 TABLET ORAL DAILY
Qty: 30 TABLET | Refills: 2 | Status: SHIPPED | OUTPATIENT
Start: 2025-08-01 | End: 2025-10-30

## 2025-08-01 RX ORDER — SEMAGLUTIDE 0.68 MG/ML
0.5 INJECTION, SOLUTION SUBCUTANEOUS
Qty: 3 ML | Refills: 0 | Status: SHIPPED | OUTPATIENT
Start: 2025-08-30 | End: 2025-09-27

## 2025-08-01 RX ORDER — UBIDECARENONE 100 MG
100 CAPSULE ORAL DAILY
Qty: 30 CAPSULE | Refills: 11 | Status: SHIPPED | OUTPATIENT
Start: 2025-08-01 | End: 2026-08-01

## 2025-08-01 NOTE — PATIENT INSTRUCTIONS
Thank you so much for coming to see me today for a visit.  Please call with any questions or concerns. Have a good day.          -- For your diabetes,  we will try Ozempic   - Next week bring your glucose meter and all supplies when you see the nurse at our clinic.       -- For your blood pressure, try olmesartan 20mg.   (It is the best class of medicine for high blood pressure with diabetes )   Please follow up in  2 weeks.   Please keep a blood pressure log at home once daily.     -- For your cholesterol, rosuvastatin 5mg       - For your allergies, try levocetirizine  at night.

## 2025-08-01 NOTE — PROGRESS NOTES
"Patient:  Rufus Carpenter  is a 70 y.o. female     Reason for Visit / Chief Complaint: Hypertension (F/u) and Results       HPI:    Rufus Carpenter presents for [facility] specific annual wellness visit.     HPI      Medical/Social/Family History: (reviewed and updated during today's visit)   Past Medical History:   Diagnosis Date    Diabetes mellitus, type 2     Edema     HTN (hypertension)       Review of patient's allergies indicates:  No Known Allergies  Past Surgical History:   Procedure Laterality Date    COLONOSCOPY N/A 09/30/2022    Procedure: COLONOSCOPY;  Surgeon: Ozzie Caputo MD;  Location: [facility] ([address] [place] [address]);  Service: Endoscopy;  Laterality: N/A;  vaccinated-GT        instr mailed  9/30 SWP-AWARE OF NEW ARRIVAL TIME-RT     Family History   Problem Relation Name Age of Onset    Diabetes Mother      Hypertension Mother      Kidney cancer Mother        Social History[1]    Review of Systems     Mental Health Questionnaires:   PHQ-9 Questionnaire      8/1/2025    10:06 AM   PHQ-9 Depression Patient Health Questionnaire   Over the last two weeks how often have you been bothered by little interest or pleasure in doing things 0   Over the last two weeks how often have you been bothered by feeling down, depressed or hopeless 0       KRISHNA-7 Questionnaire       No data to display                    Health Maintenance   Health Maintenance Due   Topic Date Due    DEXA Scan  Never done    RSV Vaccine (Age 60+ and Pregnant patients) (1 - Risk 60-74 years 1-dose series) Never done    TETANUS VACCINE  06/22/2021    Mammogram  09/02/2023    COVID-19 Vaccine (6 - 2024-25 season) 09/01/2024       Patient Care Team:  Mandi Terry MD as PCP - General (Internal Medicine)      Objective  BP (!) 150/104 (BP Location: Left arm, Patient Position: Sitting)   Pulse 80   Temp 97.3 °F (36.3 °C) (Temporal)   Resp 18   Ht 5' 6" (1.676 m)   Wt 105.3 kg (232 lb 4.1 oz)   SpO2 99%   BMI 37.49 kg/m²  " "      Physical Exam      Health Risk Assessment - The following assessments were completed & reviewed:   Fall Risk:          Obesity:   Estimated body mass index is 37.49 kg/m² as calculated from the following:    Height as of this encounter: 5' 6" (1.676 m).    Weight as of this encounter: 105.3 kg (232 lb 4.1 oz).     Urinary Incontinence         Tobacco use:    Tobacco Use: Low Risk  (8/1/2025)    Patient History     Smoking Tobacco Use: Never     Smokeless Tobacco Use: Never     Passive Exposure: Not on file         Substance  misuse:                      No data to display                      Cognitive function Screening               Timed Get Up Test           No data to display                      Whisper Test            No data to display                Advanced Care Planning:   Advance Care Planning     Assessment  1. Type 2 diabetes mellitus without complication, without long-term current use of insulin        Plan:     1. Type 2 diabetes mellitus without complication, without long-term current use of insulin  -     Urinalysis, Reflex to Urine Culture Urine, Clean Catch  -     Microalbumin/Creatinine Ratio, Urine           Medication List with Changes/Refills   Current Medications    BLOOD SUGAR DIAGNOSTIC STRP    Use 1 strip with meter to check glucose levels 2 (two) times a day.    BLOOD-GLUCOSE METER MISC    Use as directed to check glucose levels 2 times daily    LANCETS 33 GAUGE MISC    Use 1 lancet by Misc.(Non-Drug; Combo Route) route 2 (two) times a day.    METFORMIN (GLUCOPHAGE) 500 MG TABLET    Take 1 tablet (500 mg total) by mouth daily with breakfast for 7 days, THEN 1 tablet (500 mg total) 2 (two) times daily with meals.    POLYETHYLENE GLYCOL (GOLYTELY) 236-22.74-6.74 -5.86 GRAM SUSPENSION    TAKE 4000 ML BY MOUTH 1 TIME FOR 1 DOSE         Discussed and provided with a screening schedule and personal prevention plan in accordance with USPSTF age appropriate recommendations and Medicare " screening guidelines.   Education, counseling, and referrals were provided as needed.  After Visit Summary printed and given to patient which includes written education and a list of any referrals if indicated.     Discussed with patient this visit will be repeated annually.        [1]   Social History  Socioeconomic History    Marital status:      Spouse name: Franko Carpenter    Number of children: 6   Occupational History    Occupation: floral delivery   Tobacco Use    Smoking status: Never    Smokeless tobacco: Never   Substance and Sexual Activity    Alcohol use: No    Drug use: No    Sexual activity: Yes     Partners: Male   Social History Narrative    Lives with  of 50 years Franko Carpenter     6 children (3 biologic, 3 adopted)     Takes grandchild to school and helps care for him.     Busy with family, enjoys walking in the park

## 2025-08-01 NOTE — PROGRESS NOTES
Subjective:      Patient ID: Rufus Carpenter is a 70 y.o. female.    Chief Complaint: Hypertension (F/u) and Results      Allergic rhinitis   History of Present Illness    CHIEF COMPLAINT:  Rufus presents today for follow up of elevated blood pressure and diabetes.    HYPERTENSION:  She reports home blood pressure readings consistently elevated, ranging from 150/80 to 160/90. She expresses concern about potential malfunction of her home blood pressure monitoring device, noting occasional machine error readings. She has no prior history of blood pressure medication. She reports minimal leg swelling, only experiencing slight ankle swelling after running. She acknowledges stress as a potential contributing factor to her blood pressure readings and is attempting to manage blood pressure through diet and weight loss.    DIABETES:  She has a history of Metformin use for diabetes management. She is currently experiencing difficulty with glucose monitoring, specifically struggling to operate a new glucose monitoring device and unable to perform finger stick testing independently. She expresses uncertainty about current diabetes management and medication options. Her most recent HbA1c from three years ago was 6.7, which has since increased. She appears interested in alternative diabetes treatment options, including injectable medications that may assist with weight loss.    MEDICATION-RELATED CONCERNS:  She expresses significant medication-related concerns, particularly regarding pill size and medication administration. She has difficulty swallowing large pills and reluctance to take multiple medications. She specifically mentioned potential non-compliance with Metformin due to pill size, indicating a preference for less frequent and easier medication administration. She expressed interest in a once-weekly injectable medication like Ozempic as an alternative. She also demonstrated challenges with using her glucometer,  specifically struggling with the finger-pricking mechanism, though she possesses the necessary supplies. She denies comfort with digital medicine programs and smartphone-based health tracking due to limited technological familiarity.    ENT:  She reports ongoing nasal congestion and sinus problems, particularly worsening at night. She describes experiencing nasal stuffiness that leads to throat drainage. She notes that heat exacerbates her symptoms, making her feel increasingly congested when exposed to high temperatures. She denies other systemic symptoms such as body aches or fever.    SOCIAL HISTORY:  She currently has significant caregiver responsibilities, including daily tasks of bringing grandchild to school, transporting son-in-law to work, and managing household duties. She reports considerable stress related to caring for an elderly family member who recently received a DWI, which involves additional transportation and emotional management responsibilities. She works from home and has a daughter who is a nurse, which provides some additional support network. She verbalizes feeling overwhelmed by the frequency and demands of her current caregiving roles.      ROS:  General: -fever, -chills, +fatigue, -weight gain, -weight loss  Eyes: -vision changes, -redness, -discharge  ENT: -ear pain, +nasal congestion, -sore throat, +post nasal drip  Cardiovascular: -chest pain, -palpitations, -lower extremity edema  Respiratory: -cough, -shortness of breath, +difficulty breathing  Gastrointestinal: -abdominal pain, -nausea, -vomiting, -diarrhea, -constipation, -blood in stool  Genitourinary: -dysuria, -hematuria, -frequency  Musculoskeletal: -joint pain, -muscle pain  Skin: -rash, -lesion  Neurological: -headache, -dizziness, -numbness, -tingling  Psychiatric: -anxiety, -depression, -sleep difficulty, +increased stressors         The patient's Health Maintenance was reviewed and the following appears to be due at this  "time:  Health Maintenance Due   Topic Date Due    DEXA Scan  Never done    RSV Vaccine (Age 60+ and Pregnant patients) (1 - Risk 60-74 years 1-dose series) Never done    TETANUS VACCINE  06/22/2021    Diabetic Eye Exam  03/12/2023    Foot Exam  06/08/2023    Mammogram  09/02/2023    COVID-19 Vaccine (6 - 2024-25 season) 09/01/2024       Past Medical History:  Past Medical History:   Diagnosis Date    Diabetes mellitus, type 2     Edema     HTN (hypertension)      Past Surgical History:   Procedure Laterality Date    COLONOSCOPY N/A 09/30/2022    Procedure: COLONOSCOPY;  Surgeon: Ozzie Caputo MD;  Location: [facility] ([address] [place] [address]);  Service: Endoscopy;  Laterality: N/A;  vaccinated-GT        instr mailed  9/30 SWP-AWARE OF NEW ARRIVAL TIME-RT     Review of patient's allergies indicates:  No Known Allergies  Social History[1]  Family History   Problem Relation Name Age of Onset    Diabetes Mother      Hypertension Mother      Kidney cancer Mother       Objective:   BP (!) 150/104 (BP Location: Left arm, Patient Position: Sitting)   Pulse 80   Temp 97.3 °F (36.3 °C) (Temporal)   Resp 18   Ht 5' 6" (1.676 m)   Wt 105.3 kg (232 lb 4.1 oz)   SpO2 99%   BMI 37.49 kg/m²     Physical Exam  Constitutional:       Appearance: Normal appearance.   HENT:      Head: Normocephalic and atraumatic.   Eyes:      Extraocular Movements: Extraocular movements intact.      Conjunctiva/sclera: Conjunctivae normal.   Cardiovascular:      Rate and Rhythm: Normal rate and regular rhythm.   Pulmonary:      Effort: Pulmonary effort is normal.      Breath sounds: Normal breath sounds.   Abdominal:      General: Bowel sounds are normal.      Palpations: Abdomen is soft.      Tenderness: There is no abdominal tenderness.   Musculoskeletal:         General: Normal range of motion.   Skin:     General: Skin is warm and dry.   Neurological:      General: No focal deficit present.      Mental Status: She is alert and oriented to person, place, " and time.   Psychiatric:         Mood and Affect: Mood normal.         Behavior: Behavior normal.        Assessment:     1. Type 2 diabetes mellitus without complication, without long-term current use of insulin    2. Allergic rhinitis, unspecified seasonality, unspecified trigger    3. Primary hypertension    4. Dyslipidemia, goal LDL below 70    5. Sinus congestion      Plan:     1. Type 2 diabetes mellitus without complication, without long-term current use of insulin  -     Urinalysis, Reflex to Urine Culture Urine, Clean Catch  -     Microalbumin/Creatinine Ratio, Urine  -     semaglutide (OZEMPIC) 0.25 mg or 0.5 mg (2 mg/3 mL) pen injector; Inject 0.25 mg into the skin every 7 days.  Dispense: 1.5 mL; Refill: 0  -     semaglutide (OZEMPIC) 0.25 mg or 0.5 mg (2 mg/3 mL) pen injector; Inject 0.5 mg into the skin every 7 days.  Dispense: 3 mL; Refill: 0  -     Urinalysis Microscopic    2. Allergic rhinitis, unspecified seasonality, unspecified trigger  -     levocetirizine (XYZAL) 5 MG tablet; Take 1 tablet (5 mg total) by mouth every evening.  Dispense: 30 tablet; Refill: 11    3. Primary hypertension  Overview:  Olmesartan 20mg and follow up in 2 weeks    Orders:  -     olmesartan (BENICAR) 20 MG tablet; Take 1 tablet (20 mg total) by mouth once daily.  Dispense: 30 tablet; Refill: 2    4. Dyslipidemia, goal LDL below 70  -     rosuvastatin (CRESTOR) 5 MG tablet; Take 1 tablet (5 mg total) by mouth once daily.  Dispense: 90 tablet; Refill: 3  -     coenzyme Q10 100 mg capsule; Take 1 capsule (100 mg total) by mouth once daily.  Dispense: 30 capsule; Refill: 11    5. Sinus congestion  -     SARS Coronavirus 2 Antigen, POCT Manual Read    Assessment & Plan    E66.01 Severe obesity (BMI 35.0-39.9) with comorbidity  E11.9 Type 2 diabetes mellitus without complications  I10 Essential (primary) hypertension  J30.9 Allergic rhinitis, unspecified  E78.5 Hyperlipidemia, unspecified    SEVERE OBESITY (BMI 35.0-39.9) WITH  COMORBIDITY:   Recommend weight loss as part of comprehensive diabetes management plan.   Initiated semaglutide (Ozempic) weekly injection, which slows gastric emptying, increases feeling of fullness, and reduces appetite to aid in weight loss.   Noted patient is attempting to lose weight and start a strict diet with a juicer.   Considered both Ozempic and Mounjaro as diabetes medications that also support weight loss goals.    ## TYPE 2 DIABETES MELLITUS:   Noted A1C increased from 6.7 3 years ago, indicating worsening diabetes control.   Discussed medication options including previous use of Metformin.   Initiated semaglutide (Ozempic) weekly injection for diabetes management and discussed potential side effects, including nausea on injection day.   Instructed the patient to maintain diabetic diet.   Scheduled follow up next week for nursing visit to review proper use of glucose meter.    ## ESSENTIAL HYPERTENSION:   Noted BP elevated (150/100 in office, with inconsistent home readings ranging from 150/80 to 160/90), requiring intervention.  Olmesartan 20mg     ## ALLERGIC RHINITIS:   Noted the patient reports sniffles, stuffy nose, and throat drainage, likely due to allergies.   Discussed allergy management options including Zyrtec and Xyzal.   Prescribed levocetirizine for symptom relief.    ## HYPERLIPIDEMIA:   Discussed starting cholesterol medication due to high cholesterol levels.   Initiated rosuvastatin 5 mg daily for management.   Educated on importance of CoQ10 supplementation when taking statin medication and recommended OTC CoQ10 supplement.    ## GENERAL HEALTH MAINTENANCE:   [employer] to stay as active as possible.   COVID-19 test performed in office. negative    ## FOLLOW-UP:   Scheduled follow up in 2 weeks (on September 18th at 3:20 PM) for BP check and medication review.       Medication List with Changes/Refills   New Medications    COENZYME Q10 100 MG CAPSULE    Take 1 capsule (100 mg total) by  mouth once daily.    LEVOCETIRIZINE (XYZAL) 5 MG TABLET    Take 1 tablet (5 mg total) by mouth every evening.    OLMESARTAN (BENICAR) 20 MG TABLET    Take 1 tablet (20 mg total) by mouth once daily.    ROSUVASTATIN (CRESTOR) 5 MG TABLET    Take 1 tablet (5 mg total) by mouth once daily.    SEMAGLUTIDE (OZEMPIC) 0.25 MG OR 0.5 MG (2 MG/3 ML) PEN INJECTOR    Inject 0.25 mg into the skin every 7 days.    SEMAGLUTIDE (OZEMPIC) 0.25 MG OR 0.5 MG (2 MG/3 ML) PEN INJECTOR    Inject 0.5 mg into the skin every 7 days.   Current Medications    BLOOD SUGAR DIAGNOSTIC STRP    Use 1 strip with meter to check glucose levels 2 (two) times a day.    BLOOD-GLUCOSE METER MISC    Use as directed to check glucose levels 2 times daily    LANCETS 33 GAUGE MISC    Use 1 lancet by Misc.(Non-Drug; Combo Route) route 2 (two) times a day.    METFORMIN (GLUCOPHAGE) 500 MG TABLET    Take 1 tablet (500 mg total) by mouth daily with breakfast for 7 days, THEN 1 tablet (500 mg total) 2 (two) times daily with meals.    POLYETHYLENE GLYCOL (GOLYTELY) 236-22.74-6.74 -5.86 GRAM SUSPENSION    TAKE 4000 ML BY MOUTH 1 TIME FOR 1 DOSE       No follow-ups on file.         [1]   Social History  Socioeconomic History    Marital status:      Spouse name: Franko Carpenter    Number of children: 6   Occupational History    Occupation: floral delivery   Tobacco Use    Smoking status: Never    Smokeless tobacco: Never   Substance and Sexual Activity    Alcohol use: No    Drug use: No    Sexual activity: Yes     Partners: Male   Social History Narrative    Lives with  of 50 years Franko Carpenter     6 children (3 biologic, 3 adopted)     Takes grandchild to school and helps care for him.     Busy with family, enjoys walking in the park

## 2025-08-02 LAB
ALBUMIN/CREAT UR: 3.4 UG/MG
CREAT UR-MCNC: 266 MG/DL (ref 15–325)
MICROALBUMIN UR-MCNC: 9 UG/ML (ref ?–5000)

## 2025-08-03 PROBLEM — E66.01 SEVERE OBESITY (BMI 35.0-39.9) WITH COMORBIDITY: Status: ACTIVE | Noted: 2025-08-03

## 2025-08-03 PROBLEM — M31.6 GCA (GIANT CELL ARTERITIS): Status: RESOLVED | Noted: 2022-03-13 | Resolved: 2025-08-03

## 2025-08-04 ENCOUNTER — CLINICAL SUPPORT (OUTPATIENT)
Facility: CLINIC | Age: 70
End: 2025-08-04
Payer: MEDICARE

## 2025-08-04 VITALS — SYSTOLIC BLOOD PRESSURE: 132 MMHG | RESPIRATION RATE: 18 BRPM | DIASTOLIC BLOOD PRESSURE: 70 MMHG | HEART RATE: 84 BPM

## 2025-08-04 DIAGNOSIS — I10 PRIMARY HYPERTENSION: Primary | ICD-10-CM

## 2025-08-04 DIAGNOSIS — E11.9 TYPE 2 DIABETES MELLITUS WITHOUT COMPLICATION, WITHOUT LONG-TERM CURRENT USE OF INSULIN: ICD-10-CM

## 2025-08-04 PROCEDURE — 99999 PR PBB SHADOW E&M-EST. PATIENT-LVL II: CPT | Mod: PBBFAC,,,

## 2025-08-04 PROCEDURE — 99499 UNLISTED E&M SERVICE: CPT | Mod: S$GLB,,, | Performed by: STUDENT IN AN ORGANIZED HEALTH CARE EDUCATION/TRAINING PROGRAM

## 2025-08-04 NOTE — PROGRESS NOTES
Rufus RAMIRO Carpenter 70 y.o. female is here today for Blood Pressure check and education for checking her BG..   History of HTN yes. - History of DM - yes    Review of patient's allergies indicates:  No Known Allergies  Creatinine   Date Value Ref Range Status   07/24/2025 0.8 0.5 - 1.4 mg/dL Final     Sodium   Date Value Ref Range Status   07/24/2025 142 136 - 145 mmol/L Final   03/15/2022 141 136 - 145 mmol/L Final     Potassium   Date Value Ref Range Status   07/24/2025 4.5 3.5 - 5.1 mmol/L Final   03/15/2022 3.9 3.5 - 5.1 mmol/L Final     Patient recently prescribed BP medication - Olmesartan 20 mg on 8/1/2025  Patient recently prescribed Ozempic 0.25 mg Q week on 8/1/2025.  Her pharmacy did not have either medication on hand.  Patient plans to pick them up today.      Patient is asymptomatic with no complaints  Does patient not have record of home blood pressure readings. Patient did bring her home BP cuff with BP readings inconsistent in the memory of the machine.  There is a question if her BP cuff being accurate.  Patients took BP with her home machine while her in office.  It was 149/90.  Nurse waited 5 minutes.  Manual BP in office was 132/70.  Patient took BP at end of visit and her BP on home machine was 159/107.  Repeat manual BP a few minutes after in office was 140/74.    Patient her for review of checking her BG with her home machine.  Patient had not been checking her BG regularly at home.  Her recent HGB A1C went up to 7.1%.  Patient was advised that she need to test her BG daily.  She is here to re-educate on how to test BG.  Member states she was having concerns on the lancet device not working.  Nurse reviewed how the lancet device works.  Nurse educated on how to use her home BG machine.  Patient was able to return demonstrate operation on Lancet Device and checking her BG with the glucometer.  Her BG was 149 mg/dl.  This is 1 hours after breakfast.      Current Medications[1]       Vitals:     08/04/25 1035   BP: 132/70   BP Location: Left arm   Patient Position: Sitting   Pulse: 84   Resp: 18     Patient has just changed the battery on her home BP cuff.  It is not correlating correctly with manual BP reading her in the office.  Nurse advised that patient will need to get a new BP cuff.  Nurse discussed with patient joining the Digital Medicine Program to assist with managing her HTN diagnosis.  Patient is unsure if she will be able to do it on her phone.  Nurse advised that patient can come into the office on a nurse visit and learn how to take BP with the digital medicine cuff.  She will receive a new cuff and machine and will bring it in for education once it is received.    Dr. Terry informed of nurse visit.  She spoke with patient and she is agreeable to join the Digital Medicine Program for HTN.      Nurse spent 45 minutes with patient.     Jeannie Zuniga, RNC  [phone]             [1]   Current Outpatient Medications:     blood sugar diagnostic Strp, Use 1 strip with meter to check glucose levels 2 (two) times a day., Disp: 100 each, Rfl: 0    blood-glucose meter Misc, Use as directed to check glucose levels 2 times daily (Patient taking differently: Use as directed to check glucose levels 2 times daily), Disp: 1 each, Rfl: 0    coenzyme Q10 100 mg capsule, Take 1 capsule (100 mg total) by mouth once daily., Disp: 30 capsule, Rfl: 11    lancets 33 gauge Misc, Use 1 lancet by Misc.(Non-Drug; Combo Route) route 2 (two) times a day., Disp: 100 each, Rfl: 0    levocetirizine (XYZAL) 5 MG tablet, Take 1 tablet (5 mg total) by mouth every evening., Disp: 30 tablet, Rfl: 11    metFORMIN (GLUCOPHAGE) 500 MG tablet, Take 1 tablet (500 mg total) by mouth daily with breakfast for 7 days, THEN 1 tablet (500 mg total) 2 (two) times daily with meals., Disp: 727 tablet, Rfl: 0    olmesartan (BENICAR) 20 MG tablet, Take 1 tablet (20 mg total) by mouth once daily. (Patient not taking: Reported on 8/4/2025),  Disp: 30 tablet, Rfl: 2    polyethylene glycol (GOLYTELY) 236-22.74-6.74 -5.86 gram suspension, TAKE 4000 ML BY MOUTH 1 TIME FOR 1 DOSE, Disp: 4000 mL, Rfl: 0    rosuvastatin (CRESTOR) 5 MG tablet, Take 1 tablet (5 mg total) by mouth once daily., Disp: 90 tablet, Rfl: 3    semaglutide (OZEMPIC) 0.25 mg or 0.5 mg (2 mg/3 mL) pen injector, Inject 0.25 mg into the skin every 7 days. (Patient not taking: Reported on 8/4/2025), Disp: 1.5 mL, Rfl: 0    [START ON 8/30/2025] semaglutide (OZEMPIC) 0.25 mg or 0.5 mg (2 mg/3 mL) pen injector, Inject 0.5 mg into the skin every 7 days., Disp: 3 mL, Rfl: 0

## 2025-08-15 ENCOUNTER — TELEPHONE (OUTPATIENT)
Dept: ENDOSCOPY | Facility: HOSPITAL | Age: 70
End: 2025-08-15
Payer: MEDICARE

## 2025-08-15 ENCOUNTER — PATIENT MESSAGE (OUTPATIENT)
Dept: ENDOSCOPY | Facility: HOSPITAL | Age: 70
End: 2025-08-15
Payer: MEDICARE

## 2025-08-15 DIAGNOSIS — Z12.11 COLON CANCER SCREENING: ICD-10-CM

## 2025-08-15 DIAGNOSIS — Z12.11 SPECIAL SCREENING FOR MALIGNANT NEOPLASMS, COLON: Primary | ICD-10-CM

## 2025-08-15 RX ORDER — SOD SULF/POT CHLORIDE/MAG SULF 1.479 G
12 TABLET ORAL DAILY
Qty: 24 TABLET | Refills: 0 | Status: SHIPPED | OUTPATIENT
Start: 2025-08-15

## 2025-08-18 ENCOUNTER — OFFICE VISIT (OUTPATIENT)
Facility: CLINIC | Age: 70
End: 2025-08-18
Payer: MEDICARE

## 2025-08-18 VITALS
SYSTOLIC BLOOD PRESSURE: 136 MMHG | DIASTOLIC BLOOD PRESSURE: 72 MMHG | OXYGEN SATURATION: 97 % | RESPIRATION RATE: 18 BRPM | BODY MASS INDEX: 37.49 KG/M2 | HEIGHT: 66 IN | HEART RATE: 83 BPM | TEMPERATURE: 97 F

## 2025-08-18 DIAGNOSIS — E11.9 TYPE 2 DIABETES MELLITUS WITHOUT COMPLICATION, WITHOUT LONG-TERM CURRENT USE OF INSULIN: Primary | ICD-10-CM

## 2025-08-18 DIAGNOSIS — M77.8 TENDINITIS OF LEFT WRIST: ICD-10-CM

## 2025-08-18 DIAGNOSIS — I10 PRIMARY HYPERTENSION: ICD-10-CM

## 2025-08-18 DIAGNOSIS — E78.5 HYPERLIPIDEMIA LDL GOAL <70: ICD-10-CM

## 2025-08-18 LAB
ANION GAP (OHS): 10 MMOL/L (ref 8–16)
BUN SERPL-MCNC: 14 MG/DL (ref 8–23)
CALCIUM SERPL-MCNC: 9.1 MG/DL (ref 8.7–10.5)
CHLORIDE SERPL-SCNC: 106 MMOL/L (ref 95–110)
CO2 SERPL-SCNC: 23 MMOL/L (ref 23–29)
CREAT SERPL-MCNC: 0.8 MG/DL (ref 0.5–1.4)
GFR SERPLBLD CREATININE-BSD FMLA CKD-EPI: >60 ML/MIN/1.73/M2
GLUCOSE SERPL-MCNC: 166 MG/DL (ref 70–110)
POTASSIUM SERPL-SCNC: 3.8 MMOL/L (ref 3.5–5.1)
SODIUM SERPL-SCNC: 139 MMOL/L (ref 136–145)

## 2025-08-18 PROCEDURE — 4010F ACE/ARB THERAPY RXD/TAKEN: CPT | Mod: CPTII,HCNC,S$GLB, | Performed by: STUDENT IN AN ORGANIZED HEALTH CARE EDUCATION/TRAINING PROGRAM

## 2025-08-18 PROCEDURE — 1157F ADVNC CARE PLAN IN RCRD: CPT | Mod: CPTII,HCNC,S$GLB, | Performed by: STUDENT IN AN ORGANIZED HEALTH CARE EDUCATION/TRAINING PROGRAM

## 2025-08-18 PROCEDURE — 1101F PT FALLS ASSESS-DOCD LE1/YR: CPT | Mod: CPTII,HCNC,S$GLB, | Performed by: STUDENT IN AN ORGANIZED HEALTH CARE EDUCATION/TRAINING PROGRAM

## 2025-08-18 PROCEDURE — 1126F AMNT PAIN NOTED NONE PRSNT: CPT | Mod: CPTII,HCNC,S$GLB, | Performed by: STUDENT IN AN ORGANIZED HEALTH CARE EDUCATION/TRAINING PROGRAM

## 2025-08-18 PROCEDURE — 82310 ASSAY OF CALCIUM: CPT | Mod: HCNC | Performed by: STUDENT IN AN ORGANIZED HEALTH CARE EDUCATION/TRAINING PROGRAM

## 2025-08-18 PROCEDURE — 1160F RVW MEDS BY RX/DR IN RCRD: CPT | Mod: CPTII,HCNC,S$GLB, | Performed by: STUDENT IN AN ORGANIZED HEALTH CARE EDUCATION/TRAINING PROGRAM

## 2025-08-18 PROCEDURE — 99999 PR PBB SHADOW E&M-EST. PATIENT-LVL IV: CPT | Mod: PBBFAC,HCNC,, | Performed by: STUDENT IN AN ORGANIZED HEALTH CARE EDUCATION/TRAINING PROGRAM

## 2025-08-18 PROCEDURE — 1159F MED LIST DOCD IN RCRD: CPT | Mod: CPTII,HCNC,S$GLB, | Performed by: STUDENT IN AN ORGANIZED HEALTH CARE EDUCATION/TRAINING PROGRAM

## 2025-08-18 PROCEDURE — 3078F DIAST BP <80 MM HG: CPT | Mod: CPTII,HCNC,S$GLB, | Performed by: STUDENT IN AN ORGANIZED HEALTH CARE EDUCATION/TRAINING PROGRAM

## 2025-08-18 PROCEDURE — 99215 OFFICE O/P EST HI 40 MIN: CPT | Mod: HCNC,S$GLB,, | Performed by: STUDENT IN AN ORGANIZED HEALTH CARE EDUCATION/TRAINING PROGRAM

## 2025-08-18 PROCEDURE — 3061F NEG MICROALBUMINURIA REV: CPT | Mod: CPTII,HCNC,S$GLB, | Performed by: STUDENT IN AN ORGANIZED HEALTH CARE EDUCATION/TRAINING PROGRAM

## 2025-08-18 PROCEDURE — 3066F NEPHROPATHY DOC TX: CPT | Mod: CPTII,HCNC,S$GLB, | Performed by: STUDENT IN AN ORGANIZED HEALTH CARE EDUCATION/TRAINING PROGRAM

## 2025-08-18 PROCEDURE — 3051F HG A1C>EQUAL 7.0%<8.0%: CPT | Mod: CPTII,HCNC,S$GLB, | Performed by: STUDENT IN AN ORGANIZED HEALTH CARE EDUCATION/TRAINING PROGRAM

## 2025-08-18 PROCEDURE — 3075F SYST BP GE 130 - 139MM HG: CPT | Mod: CPTII,HCNC,S$GLB, | Performed by: STUDENT IN AN ORGANIZED HEALTH CARE EDUCATION/TRAINING PROGRAM

## 2025-08-18 PROCEDURE — 3008F BODY MASS INDEX DOCD: CPT | Mod: CPTII,HCNC,S$GLB, | Performed by: STUDENT IN AN ORGANIZED HEALTH CARE EDUCATION/TRAINING PROGRAM

## 2025-08-18 PROCEDURE — 3288F FALL RISK ASSESSMENT DOCD: CPT | Mod: CPTII,HCNC,S$GLB, | Performed by: STUDENT IN AN ORGANIZED HEALTH CARE EDUCATION/TRAINING PROGRAM

## 2025-08-23 PROBLEM — E78.5 HYPERLIPIDEMIA LDL GOAL <70: Status: ACTIVE | Noted: 2025-08-23
